# Patient Record
Sex: FEMALE | Race: BLACK OR AFRICAN AMERICAN | Employment: UNEMPLOYED | URBAN - METROPOLITAN AREA
[De-identification: names, ages, dates, MRNs, and addresses within clinical notes are randomized per-mention and may not be internally consistent; named-entity substitution may affect disease eponyms.]

---

## 2020-03-05 PROBLEM — K59.00 CONSTIPATION: Status: ACTIVE | Noted: 2020-03-05

## 2020-03-05 PROBLEM — K92.1 HEMATOCHEZIA: Status: ACTIVE | Noted: 2020-03-05

## 2020-03-05 PROBLEM — R93.3 ABNORMAL DIGESTIVE SYSTEM DIAGNOSTIC IMAGING: Status: ACTIVE | Noted: 2020-03-05

## 2020-11-03 ENCOUNTER — HOSPITAL ENCOUNTER (EMERGENCY)
Age: 40
Discharge: HOME | End: 2020-11-03
Attending: EMERGENCY MEDICINE
Payer: MEDICAID

## 2020-11-03 VITALS
TEMPERATURE: 99 F | DIASTOLIC BLOOD PRESSURE: 100 MMHG | BODY MASS INDEX: 31.83 KG/M2 | HEIGHT: 67 IN | WEIGHT: 202.82 LBS | RESPIRATION RATE: 18 BRPM | SYSTOLIC BLOOD PRESSURE: 185 MMHG | OXYGEN SATURATION: 98 % | HEART RATE: 67 BPM

## 2020-11-03 DIAGNOSIS — F12.188 CANNABIS HYPEREMESIS SYNDROME CONCURRENT WITH AND DUE TO CANNABIS ABUSE (HCC): ICD-10-CM

## 2020-11-03 DIAGNOSIS — F11.93 OPIATE WITHDRAWAL (HCC): Primary | ICD-10-CM

## 2020-11-03 LAB
ALBUMIN SERPL-MCNC: 3.7 G/DL (ref 3.4–5)
ALBUMIN/GLOB SERPL: 0.9 {RATIO} (ref 0.8–1.7)
ALP SERPL-CCNC: 66 U/L (ref 45–117)
ALT SERPL-CCNC: 19 U/L (ref 13–56)
AMORPH CRY URNS QL MICRO: ABNORMAL
ANION GAP SERPL CALC-SCNC: 6 MMOL/L (ref 3–18)
APPEARANCE UR: ABNORMAL
AST SERPL-CCNC: 12 U/L (ref 10–38)
BACTERIA URNS QL MICRO: ABNORMAL /HPF
BASOPHILS # BLD: 0 K/UL (ref 0–0.1)
BASOPHILS NFR BLD: 0 % (ref 0–2)
BILIRUB SERPL-MCNC: 0.5 MG/DL (ref 0.2–1)
BILIRUB UR QL: NEGATIVE
BUN SERPL-MCNC: 6 MG/DL (ref 7–18)
BUN/CREAT SERPL: 7 (ref 12–20)
CALCIUM SERPL-MCNC: 9.4 MG/DL (ref 8.5–10.1)
CHLORIDE SERPL-SCNC: 107 MMOL/L (ref 100–111)
CO2 SERPL-SCNC: 27 MMOL/L (ref 21–32)
COLOR UR: YELLOW
CREAT SERPL-MCNC: 0.88 MG/DL (ref 0.6–1.3)
DIFFERENTIAL METHOD BLD: ABNORMAL
EOSINOPHIL # BLD: 0 K/UL (ref 0–0.4)
EOSINOPHIL NFR BLD: 0 % (ref 0–5)
EPITH CASTS URNS QL MICRO: ABNORMAL /LPF (ref 0–5)
ERYTHROCYTE [DISTWIDTH] IN BLOOD BY AUTOMATED COUNT: 15.4 % (ref 11.6–14.5)
GLOBULIN SER CALC-MCNC: 3.9 G/DL (ref 2–4)
GLUCOSE SERPL-MCNC: 119 MG/DL (ref 74–99)
GLUCOSE UR STRIP.AUTO-MCNC: NEGATIVE MG/DL
HCG SERPL QL: NEGATIVE
HCT VFR BLD AUTO: 42.2 % (ref 35–45)
HGB BLD-MCNC: 14.3 G/DL (ref 12–16)
HGB UR QL STRIP: ABNORMAL
KETONES UR QL STRIP.AUTO: 15 MG/DL
LEUKOCYTE ESTERASE UR QL STRIP.AUTO: NEGATIVE
LIPASE SERPL-CCNC: 34 U/L (ref 73–393)
LYMPHOCYTES # BLD: 1.3 K/UL (ref 0.9–3.6)
LYMPHOCYTES NFR BLD: 20 % (ref 21–52)
MCH RBC QN AUTO: 28 PG (ref 24–34)
MCHC RBC AUTO-ENTMCNC: 33.9 G/DL (ref 31–37)
MCV RBC AUTO: 82.7 FL (ref 74–97)
MONOCYTES # BLD: 0.3 K/UL (ref 0.05–1.2)
MONOCYTES NFR BLD: 5 % (ref 3–10)
NEUTS SEG # BLD: 4.9 K/UL (ref 1.8–8)
NEUTS SEG NFR BLD: 75 % (ref 40–73)
NITRITE UR QL STRIP.AUTO: NEGATIVE
PH UR STRIP: >8.5 [PH] (ref 5–8)
PLATELET # BLD AUTO: 268 K/UL (ref 135–420)
PMV BLD AUTO: 9.6 FL (ref 9.2–11.8)
POTASSIUM SERPL-SCNC: 3.5 MMOL/L (ref 3.5–5.5)
PROT SERPL-MCNC: 7.6 G/DL (ref 6.4–8.2)
PROT UR STRIP-MCNC: NEGATIVE MG/DL
RBC # BLD AUTO: 5.1 M/UL (ref 4.2–5.3)
RBC #/AREA URNS HPF: ABNORMAL /HPF (ref 0–5)
SODIUM SERPL-SCNC: 140 MMOL/L (ref 136–145)
SP GR UR REFRACTOMETRY: 1.02 (ref 1–1.03)
UROBILINOGEN UR QL STRIP.AUTO: 0.2 EU/DL (ref 0.2–1)
WBC # BLD AUTO: 6.6 K/UL (ref 4.6–13.2)
WBC URNS QL MICRO: NEGATIVE /HPF (ref 0–4)

## 2020-11-03 PROCEDURE — 99285 EMERGENCY DEPT VISIT HI MDM: CPT

## 2020-11-03 PROCEDURE — 80053 COMPREHEN METABOLIC PANEL: CPT

## 2020-11-03 PROCEDURE — 84703 CHORIONIC GONADOTROPIN ASSAY: CPT

## 2020-11-03 PROCEDURE — 96375 TX/PRO/DX INJ NEW DRUG ADDON: CPT

## 2020-11-03 PROCEDURE — 81001 URINALYSIS AUTO W/SCOPE: CPT

## 2020-11-03 PROCEDURE — 85025 COMPLETE CBC W/AUTO DIFF WBC: CPT

## 2020-11-03 PROCEDURE — 74011250636 HC RX REV CODE- 250/636: Performed by: EMERGENCY MEDICINE

## 2020-11-03 PROCEDURE — 83690 ASSAY OF LIPASE: CPT

## 2020-11-03 PROCEDURE — 96374 THER/PROPH/DIAG INJ IV PUSH: CPT

## 2020-11-03 PROCEDURE — 96361 HYDRATE IV INFUSION ADD-ON: CPT

## 2020-11-03 RX ORDER — ONDANSETRON 2 MG/ML
4 INJECTION INTRAMUSCULAR; INTRAVENOUS
Status: COMPLETED | OUTPATIENT
Start: 2020-11-03 | End: 2020-11-03

## 2020-11-03 RX ORDER — BUPRENORPHINE HYDROCHLORIDE AND NALOXONE HYDROCHLORIDE DIHYDRATE 8; 2 MG/1; MG/1
1 TABLET SUBLINGUAL
Status: COMPLETED | OUTPATIENT
Start: 2020-11-03 | End: 2020-11-03

## 2020-11-03 RX ORDER — LANOLIN ALCOHOL/MO/W.PET/CERES
400 CREAM (GRAM) TOPICAL
Status: DISCONTINUED | OUTPATIENT
Start: 2020-11-03 | End: 2020-11-03 | Stop reason: HOSPADM

## 2020-11-03 RX ORDER — OMEPRAZOLE 20 MG/1
20 TABLET, DELAYED RELEASE ORAL DAILY
Qty: 15 TAB | Refills: 0 | Status: SHIPPED | OUTPATIENT
Start: 2020-11-03 | End: 2022-04-28 | Stop reason: SDUPTHER

## 2020-11-03 RX ORDER — POTASSIUM CHLORIDE 20 MEQ/1
40 TABLET, EXTENDED RELEASE ORAL
Status: DISCONTINUED | OUTPATIENT
Start: 2020-11-03 | End: 2020-11-03 | Stop reason: HOSPADM

## 2020-11-03 RX ORDER — SODIUM CHLORIDE 9 MG/ML
125 INJECTION, SOLUTION INTRAVENOUS CONTINUOUS
Status: DISCONTINUED | OUTPATIENT
Start: 2020-11-03 | End: 2020-11-03 | Stop reason: HOSPADM

## 2020-11-03 RX ORDER — PROCHLORPERAZINE EDISYLATE 5 MG/ML
10 INJECTION INTRAMUSCULAR; INTRAVENOUS
Status: COMPLETED | OUTPATIENT
Start: 2020-11-03 | End: 2020-11-03

## 2020-11-03 RX ORDER — FAMOTIDINE 10 MG/ML
20 INJECTION INTRAVENOUS
Status: COMPLETED | OUTPATIENT
Start: 2020-11-03 | End: 2020-11-03

## 2020-11-03 RX ORDER — ONDANSETRON 8 MG/1
8 TABLET, ORALLY DISINTEGRATING ORAL
Qty: 12 TAB | Refills: 0 | Status: SHIPPED | OUTPATIENT
Start: 2020-11-03 | End: 2020-11-07

## 2020-11-03 RX ORDER — DIPHENHYDRAMINE HYDROCHLORIDE 50 MG/ML
12.5 INJECTION, SOLUTION INTRAMUSCULAR; INTRAVENOUS ONCE
Status: COMPLETED | OUTPATIENT
Start: 2020-11-03 | End: 2020-11-03

## 2020-11-03 RX ADMIN — ONDANSETRON 4 MG: 2 INJECTION INTRAMUSCULAR; INTRAVENOUS at 07:44

## 2020-11-03 RX ADMIN — FAMOTIDINE 20 MG: 10 INJECTION INTRAVENOUS at 07:43

## 2020-11-03 RX ADMIN — PROCHLORPERAZINE EDISYLATE 10 MG: 5 INJECTION INTRAMUSCULAR; INTRAVENOUS at 08:26

## 2020-11-03 RX ADMIN — DIPHENHYDRAMINE HYDROCHLORIDE 12.5 MG: 50 INJECTION, SOLUTION INTRAMUSCULAR; INTRAVENOUS at 08:26

## 2020-11-03 RX ADMIN — BUPRENORPHINE HYDROCHLORIDE AND NALOXONE HYDROCHLORIDE DIHYDRATE 1 TABLET: 8; 2 TABLET SUBLINGUAL at 07:44

## 2020-11-03 RX ADMIN — BUPRENORPHINE HYDROCHLORIDE AND NALOXONE HYDROCHLORIDE DIHYDRATE 1 TABLET: 8; 2 TABLET SUBLINGUAL at 09:51

## 2020-11-03 RX ADMIN — SODIUM CHLORIDE 1000 ML: 900 INJECTION, SOLUTION INTRAVENOUS at 07:52

## 2020-11-03 RX ADMIN — SODIUM CHLORIDE 125 ML/HR: 900 INJECTION, SOLUTION INTRAVENOUS at 09:29

## 2020-11-03 NOTE — ED TRIAGE NOTES
Pt presents to ED via EMS from home with complaints of heroin withdrawals, nausea, vomiting, and burning abd pain.        Past Medical History:   Diagnosis Date    Constipation     HTN (hypertension)

## 2020-11-03 NOTE — ED PROVIDER NOTES
EMERGENCY DEPARTMENT HISTORY AND PHYSICAL EXAM    7:18 AM      Date: 11/3/2020  Patient Name: Claudetta Flies    History of Presenting Illness     Chief Complaint   Patient presents with    Vomiting     withdrawals    Abdominal Pain         History Provided By: Patient  Location/Duration/Severity/Modifying factors   Patient is a 51-year-old female with a history of hypertension, tubal ligation, pelvic surgeries in the past, the presents emergency department with complaint of nausea and vomiting the last 24 hours. Patient notes that she use 8 caps of heroin today intranasally and stopped using 2 days ago. Patient has progressively had back pain, diarrhea, vomiting, chills, and pain. Patient denies use in the last 2 days and denies any use of medication assisted therapies in the past.  Patient does not say why she wants to stop using heroin. Patient denies any chance being pregnant. Patient denies any sick contacts. Patient denies any other aggravating or alleviating factors. Patient admits to being a smoker, not a regular alcohol user, and heroin/marijuana user. PCP: None    Current Facility-Administered Medications   Medication Dose Route Frequency Provider Last Rate Last Dose    0.9% sodium chloride infusion  125 mL/hr IntraVENous CONTINUOUS Steph Lao  mL/hr at 11/03/20 0929 125 mL/hr at 11/03/20 0929    buprenorphine-naloxone (SUBOXONE) 8-2mg SL tablet  1 Tab SubLINGual NOW Steph Lao MD         Current Outpatient Medications   Medication Sig Dispense Refill    omeprazole (PRILOSEC) 20 mg capsule TAKE 1 CAPSULE BY MOUTH TWICE DAILY FOR 14 DAYS      ondansetron hcl (ZOFRAN) 4 mg tablet TAKE 1 TABLET BY MOUTH EVERY 6 HOURS AS NEEDED FOR NAUSEA      cloNIDine HCl (CATAPRES) 0.2 mg tablet Take  by mouth two (2) times a day.  amLODIPine-benazepril (LOTREL) 10-40 mg per capsule Take 1 Cap by mouth daily.          Past History     Past Medical History:  Past Medical History:   Diagnosis Date    Constipation     HTN (hypertension)        Past Surgical History:  Past Surgical History:   Procedure Laterality Date    COLONOSCOPY N/A 3/5/2020    DIAGNOSTIC COLONOSCOPY performed by [facility]res [facility] at [facility]    HX  SECTION      HX HYSTEROSCOPY WITH ENDOMETRIAL ABLATION      HX PARTIAL THYROIDECTOMY         Family History:  Family History   Problem Relation Age of Onset   Nahid Ortez Hypertension Mother     Hypertension Father        Social History:  Social History     Tobacco Use    Smoking status: Current Every Day Smoker     Packs/day: 1.50     Years: 24.00     Pack years: 36.00    Smokeless tobacco: Former User   Substance Use Topics    Alcohol use: Not Currently    Drug use: Yes     Frequency: 5.0 times per week     Types: Marijuana     Comment: LAST SMOKED MARIJUANA 3/4/2020       Allergies: Allergies   Allergen Reactions    Norco [Hydrocodone-Acetaminophen] Shortness of Breath and Rash         Review of Systems       Review of Systems   Constitutional: Negative for activity change, fatigue and fever. HENT: Negative for congestion and rhinorrhea. Eyes: Negative for visual disturbance. Respiratory: Negative for shortness of breath. Cardiovascular: Negative for chest pain and palpitations. Gastrointestinal: Positive for abdominal pain, diarrhea, nausea and vomiting. Genitourinary: Negative for dysuria and hematuria. Musculoskeletal: Positive for back pain. Skin: Negative for rash. Neurological: Negative for dizziness, weakness and light-headedness. All other systems reviewed and are negative. Physical Exam     Visit Vitals  BP (!) 152/91   Pulse 66   Temp 98.9 °F (37.2 °C)   Resp (!) 34   Ht 5' 7\" (1.702 m)   Wt 92 kg (202 lb 13.2 oz)   SpO2 96%   BMI 31.77 kg/m²         Physical Exam  Vitals signs and nursing note reviewed. Constitutional:       General: She is not in acute distress.      Appearance: She is well-developed. HENT:      Head: Normocephalic and atraumatic. Right Ear: External ear normal.      Left Ear: External ear normal.      Nose: Nose normal.   Eyes:      General: No scleral icterus. Conjunctiva/sclera: Conjunctivae normal.      Pupils: Pupils are equal, round, and reactive to light. Neck:      Musculoskeletal: Normal range of motion and neck supple. Thyroid: No thyromegaly. Vascular: No JVD. Trachea: No tracheal deviation. Cardiovascular:      Rate and Rhythm: Normal rate and regular rhythm. Heart sounds: Normal heart sounds. No murmur. No friction rub. No gallop. Pulmonary:      Effort: Pulmonary effort is normal.      Breath sounds: Normal breath sounds. Chest:      Chest wall: No tenderness. Abdominal:      General: There is no distension. Palpations: Abdomen is soft. Tenderness: There is abdominal tenderness. There is no guarding or rebound. Comments: Increased bowel sounds    Musculoskeletal: Normal range of motion. General: No tenderness. Lymphadenopathy:      Cervical: No cervical adenopathy. Skin:     General: Skin is warm and dry. Neurological:      Mental Status: She is alert and oriented to person, place, and time. Cranial Nerves: No cranial nerve deficit. Coordination: Coordination normal.      Comments: No sensory loss, Gait normal, Motor 5/5   Psychiatric:         Behavior: Behavior normal.         Thought Content:  Thought content normal.         Judgment: Judgment normal.           Diagnostic Study Results     Labs -  Recent Results (from the past 12 hour(s))   CBC WITH AUTOMATED DIFF    Collection Time: 11/03/20  7:40 AM   Result Value Ref Range    WBC 6.6 4.6 - 13.2 K/uL    RBC 5.10 4.20 - 5.30 M/uL    HGB 14.3 12.0 - 16.0 g/dL    HCT 42.2 35.0 - 45.0 %    MCV 82.7 74.0 - 97.0 FL    MCH 28.0 24.0 - 34.0 PG    MCHC 33.9 31.0 - 37.0 g/dL    RDW 15.4 (H) 11.6 - 14.5 %    PLATELET 090 736 - 503 K/uL    MPV 9.6 9.2 - 11.8 FL    NEUTROPHILS 75 (H) 40 - 73 %    LYMPHOCYTES 20 (L) 21 - 52 %    MONOCYTES 5 3 - 10 %    EOSINOPHILS 0 0 - 5 %    BASOPHILS 0 0 - 2 %    ABS. NEUTROPHILS 4.9 1.8 - 8.0 K/UL    ABS. LYMPHOCYTES 1.3 0.9 - 3.6 K/UL    ABS. MONOCYTES 0.3 0.05 - 1.2 K/UL    ABS. EOSINOPHILS 0.0 0.0 - 0.4 K/UL    ABS. BASOPHILS 0.0 0.0 - 0.1 K/UL    DF AUTOMATED     METABOLIC PANEL, COMPREHENSIVE    Collection Time: 11/03/20  7:40 AM   Result Value Ref Range    Sodium 140 136 - 145 mmol/L    Potassium 3.5 3.5 - 5.5 mmol/L    Chloride 107 100 - 111 mmol/L    CO2 27 21 - 32 mmol/L    Anion gap 6 3.0 - 18 mmol/L    Glucose 119 (H) 74 - 99 mg/dL    BUN 6 (L) 7.0 - 18 MG/DL    Creatinine 0.88 0.6 - 1.3 MG/DL    BUN/Creatinine ratio 7 (L) 12 - 20      GFR est AA >60 >60 ml/min/1.73m2    GFR est non-AA >60 >60 ml/min/1.73m2    Calcium 9.4 8.5 - 10.1 MG/DL    Bilirubin, total 0.5 0.2 - 1.0 MG/DL    ALT (SGPT) 19 13 - 56 U/L    AST (SGOT) 12 10 - 38 U/L    Alk.  phosphatase 66 45 - 117 U/L    Protein, total 7.6 6.4 - 8.2 g/dL    Albumin 3.7 3.4 - 5.0 g/dL    Globulin 3.9 2.0 - 4.0 g/dL    A-G Ratio 0.9 0.8 - 1.7     LIPASE    Collection Time: 11/03/20  7:40 AM   Result Value Ref Range    Lipase 34 (L) 73 - 393 U/L   HCG QL SERUM    Collection Time: 11/03/20  7:40 AM   Result Value Ref Range    HCG, Ql. Negative NEG     URINALYSIS W/ RFLX MICROSCOPIC    Collection Time: 11/03/20  8:28 AM   Result Value Ref Range    Color YELLOW      Appearance TURBID      Specific gravity 1.016 1.005 - 1.030      pH (UA) >8.5 (H) 5.0 - 8.0    Protein Negative NEG mg/dL    Glucose Negative NEG mg/dL    Ketone 15 (A) NEG mg/dL    Bilirubin Negative NEG      Blood SMALL (A) NEG      Urobilinogen 0.2 0.2 - 1.0 EU/dL    Nitrites Negative NEG      Leukocyte Esterase Negative NEG     URINE MICROSCOPIC ONLY    Collection Time: 11/03/20  8:28 AM   Result Value Ref Range    WBC Negative 0 - 4 /hpf    RBC 0 to 4 0 - 5 /hpf    Epithelial cells 1+ 0 - 5 /lpf Bacteria 1+ (A) NEG /hpf    Amorphous Crystals 2+ (A) NEG       Radiologic Studies -   No orders to display         Medical Decision Making   I am the first provider for this patient. I reviewed the vital signs, available nursing notes, past medical history, past surgical history, family history and social history. Vital Signs-Reviewed the patient's vital signs. Records Reviewed: Nursing Notes and Old Medical Records (Time of Review: 7:18 AM)    ED Course: Progress Notes, Reevaluation, and Consults:     Patient is improving however still has piloerection and increased bowel sounds. The patient is having intermittent nausea as well. Patient was given a dose of Compazine but will give another dose of Suboxone, her labs are otherwise reassuring and suspect this is hyperemesis cannabinoid syndrome versus opioid withdrawal.Luis De Leon, DO 9:33 AM    Patient is now tolerating p.o. and would not take the oral electrolytes however will have her increase her p.o. fluid intake as well as drink Gatorade at home while she is recovering. We will also give her a list of outpatient Suboxone programs as I think she will benefit from transitioning to an outpatient Suboxone. I discussed the case with the peer  and will also help get the patient into the [address] [place] [address] medication assisted therapy program.Luis De Leon, DO 4:02 PM        Provider Notes (Medical Decision Making):   MDM  Number of Diagnoses or Management Options  Diagnosis management comments: Patient is a 69-year-old female with a history of thyroid disease, hypertension, constipation, opioid dependence who presents emergency department with 1 day of increasing abdominal pain, back pain, chills, nausea, vomiting, and diarrhea after stopping using heroin.   Patient likely has opioid withdrawal however underlying infection versus abdominal pathology will be considered and follow abdominal labs, urinalysis, pregnancy status although the patient's had a tubal ligation, give a dose of Suboxone as the patient has a cows score of 18, and then reevaluate. Latonya Patel, DO 7:31 AM        Procedures          Diagnosis     Clinical Impression:   1. Opiate withdrawal ([facility]    2. Cannabis hyperemesis syndrome concurrent with and due to cannabis abuse ([facility]        Disposition: DC    Follow-up Information    None          Patient's Medications   Start Taking    No medications on file   Continue Taking    AMLODIPINE-BENAZEPRIL (LOTREL) 10-40 MG PER CAPSULE    Take 1 Cap by mouth daily. CLONIDINE HCL (CATAPRES) 0.2 MG TABLET    Take  by mouth two (2) times a day. OMEPRAZOLE (PRILOSEC) 20 MG CAPSULE    TAKE 1 CAPSULE BY MOUTH TWICE DAILY FOR 14 DAYS    ONDANSETRON HCL (ZOFRAN) 4 MG TABLET    TAKE 1 TABLET BY MOUTH EVERY 6 HOURS AS NEEDED FOR NAUSEA   These Medications have changed    No medications on file   Stop Taking    No medications on file     Disclaimer: Sections of this note are dictated using utilizing voice recognition software. Minor typographical errors may be present. If questions arise, please do not hesitate to contact me or call our department.

## 2020-11-03 NOTE — DISCHARGE INSTRUCTIONS
Patient Education       Suboxone Programs in [address] Fax: [phone], phone: [phone]    [place] Fax: [phone], phone: [phone]    [place]:   [facility] - [address]- [facility]- [phone]  [facility]- [address]- [phone]     [address]-  Fax: [phone], phone [phone] or [phone]    [facility]- [phone]        Learning About Heroin Use and Withdrawal  What is heroin use? Heroin is an illegal, highly addictive drug. It's an opioid, like some types of medicines that doctors prescribe to treat pain. Examples of prescribed opioids include hydrocodone, oxycodone, fentanyl, and morphine. But unlike a prescribed opioid, heroin does not have rules for legal use. Heroin has no . The strength of each dose is not known. It is often mixed (cut) with other drugs or things like powdered milk. It may also be cut with poisons, such as strychnine. Often, heroin use starts with the misuse of a prescribed opioid. A person may then switch to heroin because of its lower cost, in spite of the greater danger of using it. A person who uses heroin often will start needing bigger and bigger doses of the drug to get the same effect. This is called tolerance. If a person uses heroin regularly, the body gets used to it. This is called physical dependence. This leads to withdrawal symptoms within a few hours if the person stops using it or uses less. A person who uses heroin daily can become addicted to it within a few weeks. Heroin addiction means a person has a strong need to keep using heroin even though it causes harm to themselves or to others. Heroin addiction is also called opioid use disorder. Heroin overdose and rescue treatment  Taking too much heroin can be dangerous.  An overdose may cause trouble breathing, low blood pressure, a low heart rate, or a coma. It's hard to know how much heroin can cause an overdose. A lot depends on how strong the drug is and what it's cut with. And if a person starts using less heroin, he or she may lose tolerance to it. The person then may be more sensitive to it and may overdose when using less heroin. If you are worried that you or someone you know will take too much heroin, talk to your doctor about a naloxone rescue kit. Naloxone helps reverse the effects of heroin. If you take too much heroin, a kit can save your life. What happens during withdrawal?  If a person who is physically dependent on heroin stops taking it or uses less, he or she will have withdrawal symptoms within a few hours. Symptoms can include anxiety, nausea, sweating, and chills. The person may also have diarrhea, stomach cramps, and muscle aches. These symptoms may be mild or severe. They may feel like the flu (influenza). Withdrawal can last from weeks to months. A person who has stopped using heroin will feel very ill for several days. A doctor may prescribe medicine to help relieve the symptoms. Cravings for heroin usually go away over the next few months. But they may suddenly come back months later. How can a person get help? If a person decides to stop using heroin, it's safest to go through withdrawal under a doctor's care. Treatment for opioid use disorder may include medicine, group therapy, counseling, and education. The person may need to stay in a hospital or treatment center. Sometimes medicines are used to help the person take less heroin over time and then quit. Medicines can help control cravings and ease withdrawal symptoms. Without medicine, people who have opioid use disorder usually go back to using heroin. Treatment focuses on more than heroin use. It helps the person understand why he or she started using heroin in the first place.  It also helps the person cope with the emotions that may come with trying to stop using heroin. Counseling can also help the person's friends and family. It can provide support and teach those people how to give the help that the person needs. Follow-up care is a key part of your treatment and safety. Be sure to make and go to all appointments, and call your doctor if you are having problems. It's also a good idea to know your test results and keep a list of the medicines you take. Where can you learn more? Go to http://janine-trace.info/  Enter P277 in the search box to learn more about \"Learning About Heroin Use and Withdrawal.\"  Current as of: June 29, 2020               Content Version: 12.6  © 2006-2020 [employer]. Care instructions adapted under license by [employer] (which disclaims liability or warranty for this information). If you have questions about a medical condition or this instruction, always ask your healthcare professional. [place] [address] any warranty or liability for your use of this information. Patient Education        Marijuana Use: Care Instructions  Overview  During your exam, traces of marijuana were found in your body. The two most active chemicals in marijuana are THC and CBD. THC affects how you think, act, and feel. It can make you feel very happy or \"high. \" CBD can help you feel relaxed without the \"high. \" Marijuana products usually contain both THC and CBD. THC usually can be found in urine for a few days after marijuana is used. If you regularly use a lot of marijuana, THC may be found for weeks after use has stopped. There are many types, or strains, of marijuana. Each strain has specific THC-to-CBD ratios. Because of this, some strains have different kinds of effects than others. For example, if a strain of marijuana has a higher ratio of THC to CBD, it's more likely to affect your judgment, coordination, and decision making.   In the [place], it's against federal law to possess, sell, give away, or grow marijuana for any purpose. But many states allow people with certain health problems to buy or grow it for their own use. And some states allow people to use it for recreational reasons. These laws vary from state to state. You can call your state department of health or health services to learn more about the laws in your state. If you live in a state where marijuana is legal, know your employer's policies about use. A positive drug test might cause you to lose your job. Or it might keep you from getting hired. If you use marijuana, take steps to lower your risk. Follow-up care is a key part of your treatment and safety. Be sure to make and go to all appointments, and call your doctor if you are having problems. It's also a good idea to know your test results and keep a list of the medicines you take. How can you care for yourself at home? · To have the lowest risk, don't use marijuana. But if you do use it, limit your use. · Know what you're using. Choose products that have low levels of THC. The type (or strain), strength, and effects of marijuana can vary greatly. And understand how soon you may feel the effects of the product you use and how long those effects may last. The product label may have this information. · Don't drive or operate machinery after using marijuana. Using marijuana may affect your judgment, coordination, and decision making. · Don't smoke marijuana. The smoke can damage your lungs. If you do smoke it, don't breathe in deeply and don't hold your breath. · Don't use marijuana with alcohol, tobacco, or illegal drugs. · Reduce the risk of medicine interactions. Marijuana can be dangerous if you take it with blood thinners or with medicines that make you sleepy, control your mood, or lower your blood pressure. Talk to your doctor about other medicines you use before you try marijuana. · Keep others safe. Store marijuana in a safe and secure place. This is even more important with edible marijuana, which can be easily mistaken for treats or snacks. Make sure that children, friends, family, and pets can't get to it. And protect others from secondhand smoke. When should you call for help? Call your doctor now or seek immediate medical care if:    · You have new or worse symptoms of cannabis hyperemesis syndrome (CHS), such as:  ? Vomiting that doesn't stop. ? Not being able to keep down fluids. ? Belly pain. ? Symptoms that go away briefly when you take a hot bath or shower. This is one of the signs of CHS.     · You have symptoms of dehydration, such as:  ? Dry eyes and a dry mouth. ? Passing only a little dark urine. ? Feeling thirstier than usual.   Watch closely for changes in your health, and contact your doctor if:    · You think you have a problem with marijuana use. Where can you learn more? Go to http://www.gray.com/  Enter P683 in the search box to learn more about \"Marijuana Use: Care Instructions. \"  Current as of: June 29, 2020               Content Version: 12.6  © 7730-7491 [employer]. Care instructions adapted under license by [employer] (which disclaims liability or warranty for this information). If you have questions about a medical condition or this instruction, always ask your healthcare professional. [place] [address] any warranty or liability for your use of this information.

## 2020-11-03 NOTE — ED NOTES
Pt vomitted when attempting to take PO magnesium and potassium, ERMD made aware and gave verbal order to hold PO meds and slowly introduce ice chips.

## 2022-03-19 PROBLEM — K59.00 CONSTIPATION: Status: ACTIVE | Noted: 2020-03-05

## 2022-03-19 PROBLEM — R93.3 ABNORMAL DIGESTIVE SYSTEM DIAGNOSTIC IMAGING: Status: ACTIVE | Noted: 2020-03-05

## 2022-03-19 PROBLEM — K92.1 HEMATOCHEZIA: Status: ACTIVE | Noted: 2020-03-05

## 2022-04-28 ENCOUNTER — VIRTUAL VISIT (OUTPATIENT)
Dept: INTERNAL MEDICINE CLINIC | Age: 42
End: 2022-04-28
Payer: MEDICAID

## 2022-04-28 DIAGNOSIS — Z76.89 ESTABLISHING CARE WITH NEW DOCTOR, ENCOUNTER FOR: ICD-10-CM

## 2022-04-28 DIAGNOSIS — Z71.6 ENCOUNTER FOR COUNSELING FOR TOBACCO USE DISORDER: ICD-10-CM

## 2022-04-28 DIAGNOSIS — R11.2 NAUSEA AND VOMITING, UNSPECIFIED VOMITING TYPE: ICD-10-CM

## 2022-04-28 DIAGNOSIS — R10.84 GENERALIZED ABDOMINAL PAIN: Primary | ICD-10-CM

## 2022-04-28 DIAGNOSIS — F17.200 TOBACCO USE DISORDER: ICD-10-CM

## 2022-04-28 PROCEDURE — 99406 BEHAV CHNG SMOKING 3-10 MIN: CPT | Performed by: STUDENT IN AN ORGANIZED HEALTH CARE EDUCATION/TRAINING PROGRAM

## 2022-04-28 PROCEDURE — 99203 OFFICE O/P NEW LOW 30 MIN: CPT | Performed by: STUDENT IN AN ORGANIZED HEALTH CARE EDUCATION/TRAINING PROGRAM

## 2022-04-28 RX ORDER — HYDROCHLOROTHIAZIDE 25 MG/1
25 TABLET ORAL DAILY
COMMUNITY
Start: 2021-07-16

## 2022-04-28 RX ORDER — CLONIDINE HYDROCHLORIDE 0.2 MG/1
0.2 TABLET ORAL 2 TIMES DAILY
COMMUNITY
Start: 2022-04-18

## 2022-04-28 RX ORDER — OMEPRAZOLE 20 MG/1
20 TABLET, DELAYED RELEASE ORAL DAILY
Qty: 30 TABLET | Refills: 0 | Status: SHIPPED | OUTPATIENT
Start: 2022-04-28 | End: 2022-04-29

## 2022-04-28 NOTE — PROGRESS NOTES
For virtual visit patient would like to receive link via TEXT/EMAIL: text    Nano Rosales is a 39 y.o. female (: 1980) evaluated via telephone on 2022 to address:    Chief Complaint   Patient presents with    New Patient    Establish Care    Nausea     x 4-5 days    Vomiting       There were no vitals filed for this visit. Allergies Reviewed. Learning Assessment:   No flowsheet data found. Depression Screening:     3 most recent PHQ Screens 2022   Little interest or pleasure in doing things Not at all   Feeling down, depressed, irritable, or hopeless Not at all   Total Score PHQ 2 0     Fall Risk Assessment:   No flowsheet data found. Abuse Screening:   No flowsheet data found. Coordination of Care Questionaire:   1. Have you been to the ER, urgent care clinic since your last visit? Hospitalized since your last visit? NO    2. Have you seen or consulted any other health care providers outside of the [address] [place] [address] since your last visit? Include any pap smears or colon screening. NO    Advanced Directive:   1. Do you have an Advanced Directive? NO    2. Would you like information on Advanced Directives?  NO

## 2022-04-28 NOTE — PROGRESS NOTES
Eryn Farmer (: 1980) is a 39 y.o. female, new patient, here for evaluation of the following chief complaint(s):   New Patient, Establish Care, Nausea (x 4-5 days), and Vomiting       ASSESSMENT/PLAN:  Below is the assessment and plan developed based on review of pertinent history, labs, studies, and medications. 1. Generalized abdominal pain  -     omeprazole (PriLOSEC OTC) 20 mg tablet; Take 1 Tablet by mouth daily. , Normal, Disp-30 Tablet, R-0  2. Nausea and vomiting, unspecified vomiting type  3. Tobacco use disorder  4. Encounter for counseling for tobacco use disorder  5. Establishing care with new doctor, encounter for  DDx includes viral gastroenteritis vs PUD. Will trial omeprazole 20mg for 1 mo  Advised to increase to solid food as able to tolerate. Remain hydrated  If no improvement after 3 weeks will w/u with ultrasound and GI referral  Counseled on tobacco cessation and harm reduction. Discussed at length for at least 5 min       Return if symptoms worsen or fail to improve. SUBJECTIVE/OBJECTIVE:  New pt here to [facility] care    Vomiting- Starting vomiting for 1 week. Had grandkids on Thursday and the Friday she woke up with vomiting and diarrhea. Has vomited a total of 8 times, thought there was some blood. Only able to keep down liquids. Had a recorded temp of 101. Hasnt tried to take Tylenol. Took prilosec which helpedGrandkids her one of her twins were sick with colds. Review of Systems   Constitutional: Positive for fever. Gastrointestinal: Positive for diarrhea, nausea and vomiting. Negative for blood in stool. Genitourinary: Negative for hematuria.         No data recorded     Physical Exam    [INSTRUCTIONS:  \"[x]\" Indicates a positive item  \"[]\" Indicates a negative item  -- DELETE ALL ITEMS NOT EXAMINED]    Constitutional: [x] Appears well-developed and well-nourished [x] No apparent distress      [] Abnormal -     Mental status: [x] Alert and awake  [x] Oriented to person/place/time [x] Able to follow commands    [] Abnormal -     Eyes:   EOM    [x]  Normal    [] Abnormal -   Sclera  [x]  Normal    [] Abnormal -          Discharge [x]  None visible   [] Abnormal -     HENT: [x] Normocephalic, atraumatic  [] Abnormal -   [x] Mouth/Throat: Mucous membranes are moist    External Ears [x] Normal  [] Abnormal -    Neck: [x] No visualized mass [] Abnormal -     Pulmonary/Chest: [x] Respiratory effort normal   [x] No visualized signs of difficulty breathing or respiratory distress        [] Abnormal -      Musculoskeletal:   [x] Normal gait with no signs of ataxia         [x] Normal range of motion of neck        [] Abnormal -     Neurological:        [x] No Facial Asymmetry (Cranial nerve 7 motor function) (limited exam due to video visit)          [x] No gaze palsy        [] Abnormal -          Skin:        [x] No significant exanthematous lesions or discoloration noted on facial skin         [] Abnormal -            Psychiatric:       [x] Normal Affect [] Abnormal -        [x] No Hallucinations    Other pertinent observable physical exam findings:-        On this date 04/28/2022 I have spent 30 minutes reviewing previous notes, test results and face to face (virtual) with the patient discussing the diagnosis and importance of compliance with the treatment plan as well as documenting on the day of the visitJey Martell, was evaluated through a synchronous (real-time) audio-video encounter. The patient (or guardian if applicable) is aware that this is a billable service, which includes applicable co-pays. Verbal consent to proceed has been obtained. The visit was conducted pursuant to the emergency declaration under the [address] [place] [address] and the [employer] Act. Patient identification was verified, and a caregiver was present when appropriate.  The patient was located at home in a state where the provider was licensed to provide care. An electronic signature was used to authenticate this note.   -- Jose Armando Cannon, DO

## 2022-04-29 ENCOUNTER — TELEPHONE (OUTPATIENT)
Dept: INTERNAL MEDICINE CLINIC | Age: 42
End: 2022-04-29

## 2022-04-29 DIAGNOSIS — R10.84 GENERALIZED ABDOMINAL PAIN: Primary | ICD-10-CM

## 2022-04-29 RX ORDER — OMEPRAZOLE 20 MG/1
20 CAPSULE, DELAYED RELEASE ORAL DAILY
Qty: 30 CAPSULE | Refills: 0 | Status: SHIPPED | OUTPATIENT
Start: 2022-04-29

## 2022-04-29 NOTE — TELEPHONE ENCOUNTER
Message received from the pharmacy. Requesting the Omeprazole Magnesium 20mg tabs be changed to Capsules. The patient's Ins will not cover the tabs. Please send new Rx for the Caps.

## 2022-06-15 ENCOUNTER — OFFICE VISIT (OUTPATIENT)
Dept: INTERNAL MEDICINE CLINIC | Age: 42
End: 2022-06-15
Payer: MEDICAID

## 2022-06-15 VITALS
WEIGHT: 286.2 LBS | DIASTOLIC BLOOD PRESSURE: 82 MMHG | OXYGEN SATURATION: 97 % | TEMPERATURE: 98.1 F | HEIGHT: 67 IN | HEART RATE: 70 BPM | SYSTOLIC BLOOD PRESSURE: 137 MMHG | BODY MASS INDEX: 44.92 KG/M2 | RESPIRATION RATE: 16 BRPM

## 2022-06-15 DIAGNOSIS — Z71.6 ENCOUNTER FOR COUNSELING FOR TOBACCO USE DISORDER: ICD-10-CM

## 2022-06-15 DIAGNOSIS — R25.2 LEG CRAMPING: ICD-10-CM

## 2022-06-15 DIAGNOSIS — I10 PRIMARY HYPERTENSION: Primary | ICD-10-CM

## 2022-06-15 DIAGNOSIS — M79.89 LEG SWELLING: ICD-10-CM

## 2022-06-15 DIAGNOSIS — E66.01 CLASS 3 SEVERE OBESITY WITH BODY MASS INDEX (BMI) OF 40.0 TO 44.9 IN ADULT, UNSPECIFIED OBESITY TYPE, UNSPECIFIED WHETHER SERIOUS COMORBIDITY PRESENT (HCC): ICD-10-CM

## 2022-06-15 DIAGNOSIS — E55.9 VITAMIN D DEFICIENCY: ICD-10-CM

## 2022-06-15 DIAGNOSIS — F17.200 TOBACCO USE DISORDER: ICD-10-CM

## 2022-06-15 PROCEDURE — 99214 OFFICE O/P EST MOD 30 MIN: CPT | Performed by: STUDENT IN AN ORGANIZED HEALTH CARE EDUCATION/TRAINING PROGRAM

## 2022-06-15 PROCEDURE — 99406 BEHAV CHNG SMOKING 3-10 MIN: CPT | Performed by: STUDENT IN AN ORGANIZED HEALTH CARE EDUCATION/TRAINING PROGRAM

## 2022-06-15 RX ORDER — BENAZEPRIL HYDROCHLORIDE 40 MG/1
40 TABLET ORAL DAILY
Qty: 90 TABLET | Refills: 0 | Status: SHIPPED | OUTPATIENT
Start: 2022-06-15

## 2022-06-15 NOTE — PROGRESS NOTES
Elisa Luna presents today for   Chief Complaint   Patient presents with    Follow-up     patient was in BP study and is afraid infection is not gone       Elisa Luna preferred language for health care discussion is english/other. Is someone accompanying this pt? no    Is the patient using any DME equipment during [address]? no    Depression Screening:  3 most recent PHQ Screens 6/15/2022   Little interest or pleasure in doing things Not at all   Feeling down, depressed, irritable, or hopeless Not at all   Total Score PHQ 2 0       Learning Assessment:  No flowsheet data found. Abuse Screening:  No flowsheet data found. Generalized Anxiety  No flowsheet data found. Health Maintenance Due   Topic Date Due    Hepatitis C Screening  Never done    COVID-19 Vaccine (1) Never done    Pneumococcal 0-64 years (1 - PCV) Never done    Cervical cancer screen  01/08/2022   . Health Maintenance reviewed and discussed and ordered per Provider. VACCINES DUE   SCREENINGS DUE       Elisa Luna is updated on all HM    1. \"Have you been to the ER, urgent care clinic since your last visit? Hospitalized since your last visit? \" No    2. \"Have you seen or consulted any other health care providers outside of the [address] [place] [address] since your last visit? \" No     3. For patients aged 39-70: Has the patient had a colonoscopy / FIT/ Cologuard? NA - based on age     If the patient is female:    4. For patients aged 41-77: Has the patient had a mammogram within the past 2 years? No    5. For patients aged 21-65: Has the patient had a pap smear?  No

## 2022-06-15 NOTE — PROGRESS NOTES
HISTORY OF PRESENT ILLNESS  Joe Miller is a 39 y.o. female. Leg swelling- Was n a HTN study starting in March and just finsihed 2 weeks ago. Started with checking BP and then they started with a injection of 3mL every week . First time got the shot got a rash on her abdomen that subsided after the 5th shot. I week ago she noticed that her left leg was swollen and looked like she had gotten a bug bite. F/u with the study dr and was placed on Augmentin for 14 days. After she completed the antibiotic she noticed that the swelling started. Medications were just changed and is currently taking HCTZ, clonidine and benazepril/amlodipine. Checks BP at home. Review of Systems   Respiratory: Negative for shortness of breath. Cardiovascular: Positive for leg swelling. Negative for chest pain and palpitations. Gastrointestinal: Negative for blood in stool. Genitourinary: Negative for hematuria. Neurological: Negative for dizziness and headaches. /82 (BP 1 Location: Left arm, BP Patient Position: Sitting, BP Cuff Size: Large adult)   Pulse 70   Temp 98.1 °F (36.7 °C) (Temporal)   Resp 16   Ht 5' 7\" (1.702 m)   Wt 286 lb 3.2 oz (129.8 kg)   SpO2 97%   BMI 44.83 kg/m²     Physical Exam  Vitals reviewed. Constitutional:       Appearance: Normal appearance. HENT:      Right Ear: Tympanic membrane normal.      Left Ear: Tympanic membrane normal.      Mouth/Throat:      Comments: MASK  Eyes:      Conjunctiva/sclera: Conjunctivae normal.      Pupils: Pupils are equal, round, and reactive to light. Cardiovascular:      Rate and Rhythm: Normal rate and regular rhythm. Pulses: Normal pulses. Heart sounds: Normal heart sounds. Pulmonary:      Effort: Pulmonary effort is normal.      Breath sounds: Normal breath sounds. Abdominal:      General: Abdomen is flat. Musculoskeletal:      Right lower le+ Edema present. Left lower le+ Edema present.    Psychiatric: Mood and Affect: Mood normal.         ASSESSMENT and PLAN    ICD-10-CM ICD-9-CM    1. Primary hypertension  M21 089.3 METABOLIC PANEL, COMPREHENSIVE      benazepriL (LOTENSIN) 40 mg tablet      METABOLIC PANEL, COMPREHENSIVE      [facility] BP FLOWSHEET   2. Class 3 severe obesity with body mass index (BMI) of 40.0 to 44.9 in adult, unspecified obesity type, unspecified whether serious comorbidity present (HCC)  E66.01 278.01 LIPID PANEL    Z68.41 V85.41 LIPID PANEL   3. Leg cramping  R25.2 729.82 MAGNESIUM      VITAMIN D, 25 HYDROXY      VITAMIN D, 25 HYDROXY      MAGNESIUM   4. Leg swelling  E16.56 958.71 METABOLIC PANEL, COMPREHENSIVE      METABOLIC PANEL, COMPREHENSIVE   5. Tobacco use disorder  F17.200 305.1    6. Encounter for counseling for tobacco use disorder  Z71.6 V65.49    Pt to to d/c amlodipine today as could be cause of swelling. Will continue to monitor on clonidine and benazepril. [facility] Will get labs to w/u for organic causes for leg cramps. If no improvement with swelling and cramps will order LEVI. Could be some reaction 2/2 to the investigational study pt is on. Counseled on tobacco cessation, NRT and harm reduction. Discussed at length for at least 5 min   Follow-up and Dispositions    · Return in about 1 month (around 7/15/2022) for BP recheck, HTN.

## 2022-06-16 LAB
25(OH)D3 SERPL-MCNC: 24.3 NG/ML (ref 32–100)
A-G RATIO,AGRAT: 1.9 RATIO (ref 1.1–2.6)
ALBUMIN SERPL-MCNC: 4.2 G/DL (ref 3.5–5)
ALP SERPL-CCNC: 83 U/L (ref 25–115)
ALT SERPL-CCNC: 10 U/L (ref 5–40)
ANION GAP SERPL CALC-SCNC: 12 MMOL/L (ref 3–15)
AST SERPL W P-5'-P-CCNC: 12 U/L (ref 10–37)
BILIRUB SERPL-MCNC: 0.1 MG/DL (ref 0.2–1.2)
BUN SERPL-MCNC: 9 MG/DL (ref 6–22)
CALCIUM SERPL-MCNC: 9.5 MG/DL (ref 8.4–10.5)
CHLORIDE SERPL-SCNC: 98 MMOL/L (ref 98–110)
CHOLEST SERPL-MCNC: 172 MG/DL (ref 110–200)
CO2 SERPL-SCNC: 32 MMOL/L (ref 20–32)
CREAT SERPL-MCNC: 0.8 MG/DL (ref 0.5–1.2)
GLOBULIN,GLOB: 2.2 G/DL (ref 2–4)
GLOMERULAR FILTRATION RATE: >60 ML/MIN/1.73 SQ.M.
GLUCOSE SERPL-MCNC: 87 MG/DL (ref 70–99)
HDLC SERPL-MCNC: 3.4 MG/DL (ref 0–5)
HDLC SERPL-MCNC: 50 MG/DL
LDL/HDL RATIO,LDHD: 2
LDLC SERPL CALC-MCNC: 102 MG/DL (ref 50–99)
MAGNESIUM SERPL-MCNC: 2.1 MG/DL (ref 1.6–2.5)
NON-HDL CHOLESTEROL, 011976: 122 MG/DL
POTASSIUM SERPL-SCNC: 4.1 MMOL/L (ref 3.5–5.5)
PROT SERPL-MCNC: 6.4 G/DL (ref 6.4–8.3)
SODIUM SERPL-SCNC: 142 MMOL/L (ref 133–145)
TRIGL SERPL-MCNC: 99 MG/DL (ref 40–149)
VLDLC SERPL CALC-MCNC: 20 MG/DL (ref 8–30)

## 2022-06-16 RX ORDER — ERGOCALCIFEROL 1.25 MG/1
50000 CAPSULE ORAL
Qty: 8 CAPSULE | Refills: 1 | Status: SHIPPED | OUTPATIENT
Start: 2022-06-16

## 2022-06-16 NOTE — PROGRESS NOTES
Your vitamin D level is low. This could be contributing to your low energy levels. I will send a prescription over for a supplement. You will take [id number] one time a week for 8 weeks. At that time we will recheck to make sure your levels are coming up.

## 2022-09-16 ENCOUNTER — HOSPITAL ENCOUNTER (OUTPATIENT)
Dept: LAB | Age: 42
Discharge: HOME | End: 2022-09-16
Payer: MEDICAID

## 2022-09-16 ENCOUNTER — TRANSCRIBE ORDER (OUTPATIENT)
Dept: REGISTRATION | Age: 42
End: 2022-09-16

## 2022-09-16 DIAGNOSIS — F11.20 OPIOID TYPE DEPENDENCE, CONTINUOUS (HCC): Primary | ICD-10-CM

## 2022-09-16 DIAGNOSIS — F11.20 OPIOID TYPE DEPENDENCE, CONTINUOUS (HCC): ICD-10-CM

## 2022-09-16 PROCEDURE — 93005 ELECTROCARDIOGRAM TRACING: CPT

## 2022-09-30 LAB
ATRIAL RATE: 76 BPM
CALCULATED P AXIS, ECG09: 43 DEGREES
CALCULATED R AXIS, ECG10: -10 DEGREES
DIAGNOSIS, 93000: NORMAL
P-R INTERVAL, ECG05: 186 MS
Q-T INTERVAL, ECG07: 422 MS
QRS DURATION, ECG06: 84 MS
QTC CALCULATION (BEZET), ECG08: 474 MS
VENTRICULAR RATE, ECG03: 76 BPM

## 2022-11-09 ENCOUNTER — OFFICE VISIT (OUTPATIENT)
Dept: INTERNAL MEDICINE CLINIC | Age: 42
End: 2022-11-09
Payer: MEDICAID

## 2022-11-09 VITALS
HEART RATE: 78 BPM | OXYGEN SATURATION: 96 % | HEIGHT: 67 IN | DIASTOLIC BLOOD PRESSURE: 125 MMHG | RESPIRATION RATE: 20 BRPM | BODY MASS INDEX: 45.99 KG/M2 | SYSTOLIC BLOOD PRESSURE: 181 MMHG | TEMPERATURE: 97.2 F | WEIGHT: 293 LBS

## 2022-11-09 DIAGNOSIS — E55.9 VITAMIN D DEFICIENCY: ICD-10-CM

## 2022-11-09 DIAGNOSIS — R10.33 PERIUMBILICAL ABDOMINAL PAIN: ICD-10-CM

## 2022-11-09 DIAGNOSIS — F17.200 TOBACCO USE DISORDER: ICD-10-CM

## 2022-11-09 DIAGNOSIS — B37.2 SKIN YEAST INFECTION: ICD-10-CM

## 2022-11-09 DIAGNOSIS — Z71.6 ENCOUNTER FOR COUNSELING FOR TOBACCO USE DISORDER: ICD-10-CM

## 2022-11-09 DIAGNOSIS — I10 PRIMARY HYPERTENSION: Primary | ICD-10-CM

## 2022-11-09 PROCEDURE — 3075F SYST BP GE 130 - 139MM HG: CPT | Performed by: STUDENT IN AN ORGANIZED HEALTH CARE EDUCATION/TRAINING PROGRAM

## 2022-11-09 PROCEDURE — 99214 OFFICE O/P EST MOD 30 MIN: CPT | Performed by: STUDENT IN AN ORGANIZED HEALTH CARE EDUCATION/TRAINING PROGRAM

## 2022-11-09 PROCEDURE — 3078F DIAST BP <80 MM HG: CPT | Performed by: STUDENT IN AN ORGANIZED HEALTH CARE EDUCATION/TRAINING PROGRAM

## 2022-11-09 PROCEDURE — 99406 BEHAV CHNG SMOKING 3-10 MIN: CPT | Performed by: STUDENT IN AN ORGANIZED HEALTH CARE EDUCATION/TRAINING PROGRAM

## 2022-11-09 RX ORDER — CLONIDINE HYDROCHLORIDE 0.2 MG/1
0.2 TABLET ORAL 2 TIMES DAILY
Qty: 180 TABLET | Refills: 0 | Status: SHIPPED | OUTPATIENT
Start: 2022-11-09 | End: 2022-11-09 | Stop reason: SDUPTHER

## 2022-11-09 RX ORDER — BENAZEPRIL HYDROCHLORIDE 40 MG/1
40 TABLET ORAL DAILY
Qty: 90 TABLET | Refills: 0 | Status: SHIPPED | OUTPATIENT
Start: 2022-11-09 | End: 2022-11-09 | Stop reason: SDUPTHER

## 2022-11-09 RX ORDER — NYSTATIN 100000 [USP'U]/G
POWDER TOPICAL 4 TIMES DAILY
Qty: 60 G | Refills: 0 | Status: SHIPPED | OUTPATIENT
Start: 2022-11-09

## 2022-11-09 RX ORDER — NYSTATIN 100000 U/G
CREAM TOPICAL 2 TIMES DAILY
Qty: 15 G | Refills: 0 | Status: SHIPPED | OUTPATIENT
Start: 2022-11-09

## 2022-11-09 RX ORDER — BENAZEPRIL HYDROCHLORIDE 40 MG/1
40 TABLET ORAL DAILY
Qty: 90 TABLET | Refills: 0 | Status: SHIPPED | OUTPATIENT
Start: 2022-11-09

## 2022-11-09 RX ORDER — ERGOCALCIFEROL 1.25 MG/1
50000 CAPSULE ORAL
Qty: 8 CAPSULE | Refills: 1 | Status: SHIPPED | OUTPATIENT
Start: 2022-11-09 | End: 2022-11-09 | Stop reason: SDUPTHER

## 2022-11-09 RX ORDER — CLONIDINE HYDROCHLORIDE 0.2 MG/1
0.2 TABLET ORAL 2 TIMES DAILY
Qty: 180 TABLET | Refills: 0 | Status: SHIPPED | OUTPATIENT
Start: 2022-11-09

## 2022-11-09 RX ORDER — ERGOCALCIFEROL 1.25 MG/1
50000 CAPSULE ORAL
Qty: 8 CAPSULE | Refills: 1 | Status: SHIPPED | OUTPATIENT
Start: 2022-11-09

## 2022-11-09 NOTE — PROGRESS NOTES
Ru Fontaine is a 39 y.o. female (: 1980) presenting to address:    Chief Complaint   Patient presents with    Skin Problem    Mass     Abd        Vitals:    22 1344   BP: (!) 181/125   Pulse: 78   Resp: 20   Temp: 97.2 °F (36.2 °C)   TempSrc: Temporal   SpO2: 96%   Weight: 293 lb (132.9 kg)   Height: 5' 7\" (1.702 m)   PainSc:   6   PainLoc: Abdomen       Hearing/Vision:   No results found. Learning Assessment:   No flowsheet data found. Depression Screening:     3 most recent PHQ Screens 2022   Little interest or pleasure in doing things Not at all   Feeling down, depressed, irritable, or hopeless Not at all   Total Score PHQ 2 0     Fall Risk Assessment:   No flowsheet data found. Abuse Screening:   No flowsheet data found. Coordination of Care Questionaire:     Advanced Directive:   1. Do you have an Advanced Directive? NO    2. Would you like information on Advanced Directives? NO    1. \"Have you been to the ER, urgent care clinic since your last visit? Hospitalized since your last visit? \" No    2. \"Have you seen or consulted any other health care providers outside of the [address] [place] [address] since your last visit? \" No     3. For patients aged 39-70: Has the patient had a colonoscopy? NA - based on age     If the patient is female:    4. For patients aged 41-77: Has the patient had a mammogram within the past 2 years? No    5. For patients aged 21-65: Has the patient had a pap smear?  No

## 2022-11-09 NOTE — PROGRESS NOTES
HISTORY OF PRESENT ILLNESS  Dinora Cramer is a 39 y.o. female. Presents today with rash    HTN- Taking clonidine and benazipril 40mg. Denies changes in vision hearing or headaches. Admits to not taking  medication today. BP Readings from Last 3 Encounters:  11/09/22 : (!) 181/125  06/15/22 : 137/82  11/07/20 : (!) 147/106      Presents today with concerns for ingrown hair and rash-Developed a pimple from ingrown hair and then tried to wash with alcohol and antiseptic/analgesic spray. She noticed that the bump and opened up and began to drain. Then 3 days later she noticed a red rash that is itching. When she tried to clean the rash with antiseptic spray it burned. Review of Systems   Constitutional:  Negative for fever. Gastrointestinal:  Negative for abdominal pain. Skin:  Positive for rash. Physical Exam  Abdominal:      Tenderness: There is no abdominal tenderness. There is no rebound. Hernia: No hernia is present. Skin:     Findings: Erythema and rash present. Rash is macular and scaling. ASSESSMENT and PLAN    ICD-10-CM ICD-9-CM    1. Primary hypertension  I10 401.9 REFERRAL TO CARDIOLOGY      cloNIDine HCL (CATAPRES) 0.2 mg tablet      benazepriL (LOTENSIN) 40 mg tablet      DISCONTINUED: cloNIDine HCL (CATAPRES) 0.2 mg tablet      DISCONTINUED: benazepriL (LOTENSIN) 40 mg tablet      2. Periumbilical abdominal pain  R10.33 789.05 US ABD COMP      3. Vitamin D deficiency  E55.9 268.9 ergocalciferol (ERGOCALCIFEROL) 1,250 mcg (50,000 unit) capsule      DISCONTINUED: ergocalciferol (ERGOCALCIFEROL) 1,250 mcg (50,000 unit) capsule      4. Skin yeast infection  B37.2 112.3 nystatin (MYCOSTATIN) powder      nystatin (MYCOSTATIN) topical cream      5. Tobacco use disorder  F17.200 305.1       6. Encounter for counseling for tobacco use disorder  Z71.6 V65.49       BP not at goal. Will need to add additional antihypertensive. At next visit if still elevated.  Referral to cardiology for further management and ECHO  ? hernia. Ordered abd ultrasound. Consider abd CT and referral to surgeon based on findings  Rash indicative of candida yeast infection. Rx nystatin powder and cream   Counseled on tobacco cessation, NRT and harm reduction. Encouraged to participate in nicotine quit now program. Discussed at length for at least 5 min   Follow-up and Dispositions    Return in about 1 month (around 12/9/2022) for BP recheck, HTN.

## 2022-12-03 ENCOUNTER — HOSPITAL ENCOUNTER (EMERGENCY)
Age: 42
Discharge: STILL ADMITTED OR ADMINISTRATIVE | End: 2022-12-03

## 2022-12-03 ENCOUNTER — HOSPITAL ENCOUNTER (OUTPATIENT)
Dept: ULTRASOUND IMAGING | Age: 42
End: 2022-12-03
Attending: STUDENT IN AN ORGANIZED HEALTH CARE EDUCATION/TRAINING PROGRAM
Payer: MEDICAID

## 2022-12-03 DIAGNOSIS — R10.33 PERIUMBILICAL ABDOMINAL PAIN: ICD-10-CM

## 2022-12-03 PROCEDURE — 76705 ECHO EXAM OF ABDOMEN: CPT

## 2023-07-14 RX ORDER — HYDROCHLOROTHIAZIDE 25 MG/1
25 TABLET ORAL DAILY
Qty: 30 TABLET | Refills: 0 | Status: CANCELLED | OUTPATIENT
Start: 2023-07-14

## 2023-07-15 DIAGNOSIS — I10 ESSENTIAL (PRIMARY) HYPERTENSION: Primary | ICD-10-CM

## 2023-07-15 RX ORDER — HYDROCHLOROTHIAZIDE 25 MG/1
25 TABLET ORAL DAILY
Qty: 30 TABLET | Refills: 0 | Status: SHIPPED | OUTPATIENT
Start: 2023-07-15

## 2023-07-15 RX ORDER — CLONIDINE HYDROCHLORIDE 0.2 MG/1
0.2 TABLET ORAL 2 TIMES DAILY
Qty: 60 TABLET | Refills: 0 | Status: SHIPPED | OUTPATIENT
Start: 2023-07-15

## 2023-07-15 RX ORDER — BENAZEPRIL HYDROCHLORIDE 40 MG/1
40 TABLET, FILM COATED ORAL DAILY
Qty: 30 TABLET | Refills: 0 | Status: SHIPPED | OUTPATIENT
Start: 2023-07-15

## 2023-07-15 RX ORDER — CLONIDINE HYDROCHLORIDE 0.2 MG/1
TABLET ORAL
Qty: 180 TABLET | OUTPATIENT
Start: 2023-07-15

## 2023-07-15 RX ORDER — BENAZEPRIL HYDROCHLORIDE 40 MG/1
TABLET, FILM COATED ORAL
Qty: 90 TABLET | OUTPATIENT
Start: 2023-07-15

## 2023-08-13 DIAGNOSIS — I10 ESSENTIAL (PRIMARY) HYPERTENSION: ICD-10-CM

## 2023-08-13 RX ORDER — CLONIDINE HYDROCHLORIDE 0.2 MG/1
TABLET ORAL
Qty: 60 TABLET | Refills: 2 | Status: SHIPPED | OUTPATIENT
Start: 2023-08-13

## 2023-08-28 ENCOUNTER — OFFICE VISIT (OUTPATIENT)
Facility: CLINIC | Age: 43
End: 2023-08-28
Payer: MEDICAID

## 2023-08-28 VITALS
SYSTOLIC BLOOD PRESSURE: 160 MMHG | OXYGEN SATURATION: 94 % | TEMPERATURE: 97.2 F | WEIGHT: 293 LBS | HEART RATE: 84 BPM | DIASTOLIC BLOOD PRESSURE: 98 MMHG | BODY MASS INDEX: 45.99 KG/M2 | HEIGHT: 67 IN | RESPIRATION RATE: 18 BRPM

## 2023-08-28 DIAGNOSIS — E66.01 CLASS 3 SEVERE OBESITY WITH BODY MASS INDEX (BMI) OF 50.0 TO 59.9 IN ADULT, UNSPECIFIED OBESITY TYPE, UNSPECIFIED WHETHER SERIOUS COMORBIDITY PRESENT (HCC): ICD-10-CM

## 2023-08-28 DIAGNOSIS — E04.1 LEFT THYROID NODULE: ICD-10-CM

## 2023-08-28 DIAGNOSIS — Z13.89 OBESITY SCREEN: ICD-10-CM

## 2023-08-28 DIAGNOSIS — Z71.82 EXERCISE COUNSELING: ICD-10-CM

## 2023-08-28 DIAGNOSIS — Z71.3 DIETARY COUNSELING: ICD-10-CM

## 2023-08-28 DIAGNOSIS — I10 ESSENTIAL (PRIMARY) HYPERTENSION: Primary | ICD-10-CM

## 2023-08-28 DIAGNOSIS — Z76.89 ENCOUNTER FOR WEIGHT MANAGEMENT: ICD-10-CM

## 2023-08-28 DIAGNOSIS — R60.0 LOWER LEG EDEMA: ICD-10-CM

## 2023-08-28 PROCEDURE — 3080F DIAST BP >= 90 MM HG: CPT | Performed by: STUDENT IN AN ORGANIZED HEALTH CARE EDUCATION/TRAINING PROGRAM

## 2023-08-28 PROCEDURE — 99214 OFFICE O/P EST MOD 30 MIN: CPT | Performed by: STUDENT IN AN ORGANIZED HEALTH CARE EDUCATION/TRAINING PROGRAM

## 2023-08-28 PROCEDURE — 3077F SYST BP >= 140 MM HG: CPT | Performed by: STUDENT IN AN ORGANIZED HEALTH CARE EDUCATION/TRAINING PROGRAM

## 2023-08-28 PROCEDURE — 99401 PREV MED CNSL INDIV APPRX 15: CPT | Performed by: STUDENT IN AN ORGANIZED HEALTH CARE EDUCATION/TRAINING PROGRAM

## 2023-08-28 RX ORDER — AMLODIPINE BESYLATE 5 MG/1
5 TABLET ORAL DAILY
Qty: 30 TABLET | Refills: 2 | Status: SHIPPED | OUTPATIENT
Start: 2023-08-28

## 2023-08-28 ASSESSMENT — PATIENT HEALTH QUESTIONNAIRE - PHQ9
SUM OF ALL RESPONSES TO PHQ9 QUESTIONS 1 & 2: 0
1. LITTLE INTEREST OR PLEASURE IN DOING THINGS: 0
SUM OF ALL RESPONSES TO PHQ QUESTIONS 1-9: 0
2. FEELING DOWN, DEPRESSED OR HOPELESS: 0
SUM OF ALL RESPONSES TO PHQ QUESTIONS 1-9: 0

## 2023-08-28 ASSESSMENT — ENCOUNTER SYMPTOMS
ABDOMINAL PAIN: 0
SHORTNESS OF BREATH: 0

## 2023-08-28 NOTE — PROGRESS NOTES
1. \"Have you been to the ER, urgent care clinic since your last visit? Hospitalized since your last visit? \" No    2. \"Have you seen or consulted any other health care providers outside of the [address] [place] [address] since your last visit? \" Yes    3. For patients aged 43-73: Has the patient had a colonoscopy / FIT/ Cologuard? yes      If the patient is female:    4. For patients aged 43-66: Has the patient had a mammogram within the past 2 years? No      5. For patients aged 21-65: Has the patient had a pap smear? Yes - Care Gap present.  Rooming MA/LPN to request most recent results

## 2023-09-06 RX ORDER — HYDROCHLOROTHIAZIDE 25 MG/1
TABLET ORAL
Qty: 90 TABLET | Refills: 0 | Status: SHIPPED | OUTPATIENT
Start: 2023-09-06

## 2023-09-12 DIAGNOSIS — I10 ESSENTIAL (PRIMARY) HYPERTENSION: ICD-10-CM

## 2023-09-12 RX ORDER — BENAZEPRIL HYDROCHLORIDE 40 MG/1
40 TABLET, FILM COATED ORAL DAILY
Qty: 90 TABLET | Refills: 0 | Status: SHIPPED | OUTPATIENT
Start: 2023-09-12 | End: 2023-10-17

## 2023-10-17 ENCOUNTER — OFFICE VISIT (OUTPATIENT)
Facility: CLINIC | Age: 43
End: 2023-10-17
Payer: MEDICAID

## 2023-10-17 VITALS
HEART RATE: 101 BPM | OXYGEN SATURATION: 96 % | HEIGHT: 67 IN | RESPIRATION RATE: 18 BRPM | BODY MASS INDEX: 45.99 KG/M2 | WEIGHT: 293 LBS | SYSTOLIC BLOOD PRESSURE: 126 MMHG | DIASTOLIC BLOOD PRESSURE: 85 MMHG | TEMPERATURE: 96.9 F

## 2023-10-17 DIAGNOSIS — Z76.89 ENCOUNTER FOR WEIGHT MANAGEMENT: ICD-10-CM

## 2023-10-17 DIAGNOSIS — H65.93 FLUID LEVEL BEHIND TYMPANIC MEMBRANE OF BOTH EARS: ICD-10-CM

## 2023-10-17 DIAGNOSIS — I10 ESSENTIAL (PRIMARY) HYPERTENSION: Primary | ICD-10-CM

## 2023-10-17 DIAGNOSIS — Z13.89 OBESITY SCREEN: ICD-10-CM

## 2023-10-17 DIAGNOSIS — E66.01 CLASS 3 SEVERE OBESITY WITH BODY MASS INDEX (BMI) OF 40.0 TO 44.9 IN ADULT, UNSPECIFIED OBESITY TYPE, UNSPECIFIED WHETHER SERIOUS COMORBIDITY PRESENT (HCC): ICD-10-CM

## 2023-10-17 PROCEDURE — 99401 PREV MED CNSL INDIV APPRX 15: CPT | Performed by: STUDENT IN AN ORGANIZED HEALTH CARE EDUCATION/TRAINING PROGRAM

## 2023-10-17 PROCEDURE — 3079F DIAST BP 80-89 MM HG: CPT | Performed by: STUDENT IN AN ORGANIZED HEALTH CARE EDUCATION/TRAINING PROGRAM

## 2023-10-17 PROCEDURE — 99214 OFFICE O/P EST MOD 30 MIN: CPT | Performed by: STUDENT IN AN ORGANIZED HEALTH CARE EDUCATION/TRAINING PROGRAM

## 2023-10-17 PROCEDURE — 3074F SYST BP LT 130 MM HG: CPT | Performed by: STUDENT IN AN ORGANIZED HEALTH CARE EDUCATION/TRAINING PROGRAM

## 2023-10-17 RX ORDER — AMLODIPINE AND BENAZEPRIL HYDROCHLORIDE 5; 40 MG/1; MG/1
1 CAPSULE ORAL DAILY
Qty: 90 CAPSULE | Refills: 0 | Status: SHIPPED | OUTPATIENT
Start: 2023-10-17

## 2023-10-17 RX ORDER — LORATADINE 10 MG/1
10 TABLET ORAL DAILY
Qty: 30 TABLET | Refills: 0 | Status: SHIPPED | OUTPATIENT
Start: 2023-10-17 | End: 2023-11-16

## 2023-10-17 RX ORDER — ERGOCALCIFEROL 1.25 MG/1
50000 CAPSULE ORAL
Qty: 4 CAPSULE | Refills: 3 | Status: SHIPPED | OUTPATIENT
Start: 2023-10-17

## 2023-10-17 RX ORDER — AMLODIPINE AND BENAZEPRIL HYDROCHLORIDE 5; 40 MG/1; MG/1
1 CAPSULE ORAL DAILY
Qty: 30 CAPSULE | Refills: 3 | Status: SHIPPED | OUTPATIENT
Start: 2023-10-17 | End: 2023-10-17

## 2023-10-17 SDOH — ECONOMIC STABILITY: INCOME INSECURITY: HOW HARD IS IT FOR YOU TO PAY FOR THE VERY BASICS LIKE FOOD, HOUSING, MEDICAL CARE, AND HEATING?: NOT HARD AT ALL

## 2023-10-17 SDOH — ECONOMIC STABILITY: TRANSPORTATION INSECURITY
IN THE PAST 12 MONTHS, HAS LACK OF TRANSPORTATION KEPT YOU FROM MEETINGS, WORK, OR FROM GETTING THINGS NEEDED FOR DAILY LIVING?: YES

## 2023-10-17 SDOH — ECONOMIC STABILITY: FOOD INSECURITY: WITHIN THE PAST 12 MONTHS, THE FOOD YOU BOUGHT JUST DIDN'T LAST AND YOU DIDN'T HAVE MONEY TO GET MORE.: NEVER TRUE

## 2023-10-17 SDOH — ECONOMIC STABILITY: FOOD INSECURITY: WITHIN THE PAST 12 MONTHS, THE FOOD YOU BOUGHT JUST DIDN'T LAST AND YOU DIDN'T HAVE MONEY TO GET MORE.: OFTEN TRUE

## 2023-10-17 SDOH — ECONOMIC STABILITY: HOUSING INSECURITY
IN THE LAST 12 MONTHS, WAS THERE A TIME WHEN YOU DID NOT HAVE A STEADY PLACE TO SLEEP OR SLEPT IN A SHELTER (INCLUDING NOW)?: NO

## 2023-10-17 SDOH — ECONOMIC STABILITY: FOOD INSECURITY: WITHIN THE PAST 12 MONTHS, YOU WORRIED THAT YOUR FOOD WOULD RUN OUT BEFORE YOU GOT MONEY TO BUY MORE.: NEVER TRUE

## 2023-10-17 SDOH — ECONOMIC STABILITY: FOOD INSECURITY: WITHIN THE PAST 12 MONTHS, YOU WORRIED THAT YOUR FOOD WOULD RUN OUT BEFORE YOU GOT MONEY TO BUY MORE.: PATIENT DECLINED

## 2023-10-17 ASSESSMENT — PATIENT HEALTH QUESTIONNAIRE - PHQ9
2. FEELING DOWN, DEPRESSED OR HOPELESS: 0
SUM OF ALL RESPONSES TO PHQ QUESTIONS 1-9: 0
SUM OF ALL RESPONSES TO PHQ QUESTIONS 1-9: 0
SUM OF ALL RESPONSES TO PHQ9 QUESTIONS 1 & 2: 0
SUM OF ALL RESPONSES TO PHQ QUESTIONS 1-9: 0
SUM OF ALL RESPONSES TO PHQ QUESTIONS 1-9: 0
1. LITTLE INTEREST OR PLEASURE IN DOING THINGS: 0

## 2023-10-17 ASSESSMENT — ENCOUNTER SYMPTOMS
ABDOMINAL PAIN: 0
SHORTNESS OF BREATH: 0
COUGH: 1

## 2023-10-17 NOTE — PROGRESS NOTES
1. \"Have you been to the ER, urgent care clinic since your last visit? Hospitalized since your last visit? \" No    2. \"Have you seen or consulted any other health care providers outside of the [address] [place] [address] since your last visit? \" Yes    3. For patients aged 43-73: Has the patient had a colonoscopy / FIT/ Cologuard? Yes - no Care Gap present      If the patient is female:    4. For patients aged 43-66: Has the patient had a mammogram within the past 2 years? No      5. For patients aged 21-65: Has the patient had a pap smear?  Yes - no Care Gap present

## 2023-11-13 DIAGNOSIS — I10 ESSENTIAL (PRIMARY) HYPERTENSION: ICD-10-CM

## 2023-11-13 RX ORDER — CLONIDINE HYDROCHLORIDE 0.2 MG/1
TABLET ORAL
Qty: 60 TABLET | Refills: 2 | Status: SHIPPED | OUTPATIENT
Start: 2023-11-13

## 2023-11-16 ENCOUNTER — PATIENT MESSAGE (OUTPATIENT)
Facility: CLINIC | Age: 43
End: 2023-11-16

## 2023-11-17 DIAGNOSIS — I10 ESSENTIAL (PRIMARY) HYPERTENSION: ICD-10-CM

## 2023-11-20 RX ORDER — AMLODIPINE AND BENAZEPRIL HYDROCHLORIDE 5; 40 MG/1; MG/1
1 CAPSULE ORAL DAILY
Qty: 90 CAPSULE | Refills: 0 | OUTPATIENT
Start: 2023-11-20

## 2023-11-20 RX ORDER — ERGOCALCIFEROL 1.25 MG/1
50000 CAPSULE ORAL
Qty: 4 CAPSULE | Refills: 3 | OUTPATIENT
Start: 2023-11-20

## 2023-11-22 NOTE — TELEPHONE ENCOUNTER
Spoke with pt in regards to medication inquiries. Two patient identifier's verified. Relayed the PCP's note, Dr. Norma Ramsay not able to prescribe these medications. Ivy Beady is on back order and Ozempic is for diabetics. If this is a medication she is seeking she can consider establishing with our medical weight loss clinic and they will see her. For the lamisil she will need an appointment to discuss further. Pt acknowledges understanding and states she is agreeable to scheduling an appointment for her foot issues. Pt voices no further concerns at this time.

## 2023-11-26 DIAGNOSIS — I10 ESSENTIAL (PRIMARY) HYPERTENSION: ICD-10-CM

## 2023-11-26 RX ORDER — BENAZEPRIL HYDROCHLORIDE 40 MG/1
40 TABLET ORAL DAILY
Qty: 90 TABLET | OUTPATIENT
Start: 2023-11-26

## 2023-11-26 RX ORDER — AMLODIPINE AND BENAZEPRIL HYDROCHLORIDE 5; 40 MG/1; MG/1
1 CAPSULE ORAL DAILY
Qty: 90 CAPSULE | Refills: 0 | Status: SHIPPED | OUTPATIENT
Start: 2023-11-26

## 2023-12-07 RX ORDER — HYDROCHLOROTHIAZIDE 25 MG/1
TABLET ORAL
Qty: 90 TABLET | Refills: 0 | Status: SHIPPED | OUTPATIENT
Start: 2023-12-07

## 2023-12-15 ENCOUNTER — OFFICE VISIT (OUTPATIENT)
Facility: CLINIC | Age: 43
End: 2023-12-15
Payer: MEDICAID

## 2023-12-15 VITALS
DIASTOLIC BLOOD PRESSURE: 88 MMHG | SYSTOLIC BLOOD PRESSURE: 148 MMHG | TEMPERATURE: 97.2 F | HEART RATE: 107 BPM | RESPIRATION RATE: 22 BRPM | OXYGEN SATURATION: 94 % | HEIGHT: 67 IN | BODY MASS INDEX: 45.99 KG/M2 | WEIGHT: 293 LBS

## 2023-12-15 DIAGNOSIS — Z79.899 MEDICATION MANAGEMENT: ICD-10-CM

## 2023-12-15 DIAGNOSIS — B35.1 ONYCHOMYCOSIS: Primary | ICD-10-CM

## 2023-12-15 DIAGNOSIS — I10 ESSENTIAL (PRIMARY) HYPERTENSION: ICD-10-CM

## 2023-12-15 PROCEDURE — 99214 OFFICE O/P EST MOD 30 MIN: CPT | Performed by: STUDENT IN AN ORGANIZED HEALTH CARE EDUCATION/TRAINING PROGRAM

## 2023-12-15 PROCEDURE — 3077F SYST BP >= 140 MM HG: CPT | Performed by: STUDENT IN AN ORGANIZED HEALTH CARE EDUCATION/TRAINING PROGRAM

## 2023-12-15 PROCEDURE — 3079F DIAST BP 80-89 MM HG: CPT | Performed by: STUDENT IN AN ORGANIZED HEALTH CARE EDUCATION/TRAINING PROGRAM

## 2023-12-15 RX ORDER — TERBINAFINE HYDROCHLORIDE 250 MG/1
250 TABLET ORAL DAILY
Qty: 42 TABLET | Refills: 0 | Status: SHIPPED | OUTPATIENT
Start: 2023-12-15 | End: 2024-01-26

## 2023-12-15 ASSESSMENT — PATIENT HEALTH QUESTIONNAIRE - PHQ9
2. FEELING DOWN, DEPRESSED OR HOPELESS: 0
SUM OF ALL RESPONSES TO PHQ QUESTIONS 1-9: 0
1. LITTLE INTEREST OR PLEASURE IN DOING THINGS: 0
SUM OF ALL RESPONSES TO PHQ QUESTIONS 1-9: 0
SUM OF ALL RESPONSES TO PHQ QUESTIONS 1-9: 0
SUM OF ALL RESPONSES TO PHQ9 QUESTIONS 1 & 2: 0
SUM OF ALL RESPONSES TO PHQ QUESTIONS 1-9: 0

## 2023-12-15 NOTE — PROGRESS NOTES
1. \"Have you been to the ER, urgent care clinic since your last visit? Hospitalized since your last visit? \" No    2. \"Have you seen or consulted any other health care providers outside of the [address] [place] [address] since your last visit? \" No    3. For patients aged 43-73: Has the patient had a colonoscopy / FIT/ Cologuard? NA - based on age      If the patient is female:    4. For patients aged 43-66: Has the patient had a mammogram within the past 2 years? No      5. For patients aged 21-65: Has the patient had a pap smear?  No

## 2024-01-18 ENCOUNTER — OFFICE VISIT (OUTPATIENT)
Facility: CLINIC | Age: 44
End: 2024-01-18
Payer: MEDICAID

## 2024-01-18 VITALS
BODY MASS INDEX: 45.99 KG/M2 | HEIGHT: 67 IN | DIASTOLIC BLOOD PRESSURE: 80 MMHG | SYSTOLIC BLOOD PRESSURE: 136 MMHG | TEMPERATURE: 97 F | WEIGHT: 293 LBS | HEART RATE: 89 BPM | OXYGEN SATURATION: 99 % | RESPIRATION RATE: 20 BRPM

## 2024-01-18 DIAGNOSIS — Z76.89 ENCOUNTER FOR WEIGHT MANAGEMENT: ICD-10-CM

## 2024-01-18 DIAGNOSIS — I10 ESSENTIAL (PRIMARY) HYPERTENSION: Primary | ICD-10-CM

## 2024-01-18 DIAGNOSIS — Z13.89 OBESITY SCREEN: ICD-10-CM

## 2024-01-18 DIAGNOSIS — E66.01 CLASS 3 SEVERE OBESITY WITH BODY MASS INDEX (BMI) OF 45.0 TO 49.9 IN ADULT, UNSPECIFIED OBESITY TYPE, UNSPECIFIED WHETHER SERIOUS COMORBIDITY PRESENT (HCC): ICD-10-CM

## 2024-01-18 PROCEDURE — 99401 PREV MED CNSL INDIV APPRX 15: CPT | Performed by: STUDENT IN AN ORGANIZED HEALTH CARE EDUCATION/TRAINING PROGRAM

## 2024-01-18 PROCEDURE — 99214 OFFICE O/P EST MOD 30 MIN: CPT | Performed by: STUDENT IN AN ORGANIZED HEALTH CARE EDUCATION/TRAINING PROGRAM

## 2024-01-18 PROCEDURE — 3079F DIAST BP 80-89 MM HG: CPT | Performed by: STUDENT IN AN ORGANIZED HEALTH CARE EDUCATION/TRAINING PROGRAM

## 2024-01-18 PROCEDURE — 3075F SYST BP GE 130 - 139MM HG: CPT | Performed by: STUDENT IN AN ORGANIZED HEALTH CARE EDUCATION/TRAINING PROGRAM

## 2024-01-18 RX ORDER — AMLODIPINE BESYLATE 5 MG/1
5 TABLET ORAL DAILY
Qty: 90 TABLET | Refills: 0 | Status: SHIPPED | OUTPATIENT
Start: 2024-01-18

## 2024-01-18 RX ORDER — BENAZEPRIL HYDROCHLORIDE 40 MG/1
40 TABLET ORAL DAILY
Qty: 90 TABLET | Refills: 0 | Status: SHIPPED | OUTPATIENT
Start: 2024-01-18

## 2024-01-18 ASSESSMENT — PATIENT HEALTH QUESTIONNAIRE - PHQ9
SUM OF ALL RESPONSES TO PHQ QUESTIONS 1-9: 0
SUM OF ALL RESPONSES TO PHQ QUESTIONS 1-9: 0
SUM OF ALL RESPONSES TO PHQ9 QUESTIONS 1 & 2: 0
2. FEELING DOWN, DEPRESSED OR HOPELESS: 0
SUM OF ALL RESPONSES TO PHQ QUESTIONS 1-9: 0
1. LITTLE INTEREST OR PLEASURE IN DOING THINGS: 0
SUM OF ALL RESPONSES TO PHQ QUESTIONS 1-9: 0

## 2024-01-18 ASSESSMENT — ENCOUNTER SYMPTOMS
SHORTNESS OF BREATH: 0
ABDOMINAL PAIN: 0

## 2024-01-18 NOTE — PROGRESS NOTES
1. \"Have you been to the ER, urgent care clinic since your last visit?  Hospitalized since your last visit?\" No    2. \"Have you seen or consulted any other health care providers outside of the [facility] since your last visit?\" No    3. For patients aged 45-75: Has the patient had a colonoscopy / FIT/ Cologuard? Yes - no Care Gap present      If the patient is female:    4. For patients aged 40-74: Has the patient had a mammogram within the past 2 years? No      5. For patients aged 21-65: Has the patient had a pap smear? Yes - no Care Gap present

## 2024-01-18 NOTE — PROGRESS NOTES
HISTORY OF PRESENT ILLNESS  Rashaun Gerardo is a 43 y.o. female presenting today for   Chief Complaint   Patient presents with    Hypertension        HTN- Started lotrel at last visit. She is unable to swallow the capsule as its too big. She has been trying to open it up but hasn't had much success. She is awaiting to get surgical clearance from cardio. Demies changes in vision hearing or headaches.            Review of Systems   Eyes:  Negative for visual disturbance.   Respiratory:  Negative for shortness of breath.    Cardiovascular:  Negative for chest pain.   Gastrointestinal:  Negative for abdominal pain.   Neurological:  Negative for headaches.         /80   Pulse 89   Temp 97 °F (36.1 °C) (Skin)   Resp 20   Ht 1.702 m (5' 7\")   Wt (!) 143.8 kg (317 lb)   SpO2 99%   BMI 49.65 kg/m²     Physical Exam  HENT:      Mouth/Throat:      Comments: MASK  Eyes:      Pupils: Pupils are equal, round, and reactive to light.   Cardiovascular:      Rate and Rhythm: Normal rate and regular rhythm.   Pulmonary:      Effort: Pulmonary effort is normal.      Breath sounds: Normal breath sounds.   Musculoskeletal:      Right lower leg: No edema.      Left lower leg: No edema.   Psychiatric:         Mood and Affect: Mood normal.         ASSESSMENT and PLAN    ICD-10-CM    1. Essential (primary) hypertension  I10 benazepril (LOTENSIN) 40 MG tablet     amLODIPine (NORVASC) 5 MG tablet      2. Class 3 severe obesity with body mass index (BMI) of 45.0 to 49.9 in adult, unspecified obesity type, unspecified whether serious comorbidity present ([facility])  E66.01     Z68.42       3. Encounter for weight management  Z76.89       4. Obesity screen  Z13.89         HTN-Chronic  -Stable. Continue current dose of amlodipine, benzapril .   -Encouraged to check BP at home  Discussed importance of limiting sodium in diet and getting 150min of exercise a week.     Obesity- Chronic  -Encouraged to continue successful weight loss plan.

## 2024-01-25 ENCOUNTER — OFFICE VISIT (OUTPATIENT)
Age: 44
End: 2024-01-25
Payer: MEDICAID

## 2024-01-25 VITALS
DIASTOLIC BLOOD PRESSURE: 80 MMHG | WEIGHT: 293 LBS | SYSTOLIC BLOOD PRESSURE: 119 MMHG | HEART RATE: 84 BPM | BODY MASS INDEX: 48.4 KG/M2 | OXYGEN SATURATION: 96 %

## 2024-01-25 DIAGNOSIS — R94.31 ABNORMAL EKG: Primary | ICD-10-CM

## 2024-01-25 DIAGNOSIS — Z01.818 PRE-OP TESTING: ICD-10-CM

## 2024-01-25 PROCEDURE — 99204 OFFICE O/P NEW MOD 45 MIN: CPT | Performed by: INTERNAL MEDICINE

## 2024-01-25 NOTE — PROGRESS NOTES
under Allergies and Med Rec to remove outside inquires. All correct information has been verified with patient and added to chart.     Medication's patient's would liked removed has been marked not taking to be removed per Verbal order and read back per Panchito Smalls MD

## 2024-01-25 NOTE — PROGRESS NOTES
release capsule Take 1 capsule by mouth daily    terbinafine (LAMISIL) 250 MG tablet Take 1 tablet by mouth daily    ergocalciferol (ERGOCALCIFEROL) 1.25 MG (19726 UT) capsule Take 1 capsule by mouth every 7 days    ondansetron (ZOFRAN-ODT) 8 MG TBDP disintegrating tablet Take 1 tablet by mouth every 8 hours as needed     No current facility-administered medications for this visit.       Past Surgical History:   Procedure Laterality Date     SECTION      COLONOSCOPY N/A 3/5/2020    DIAGNOSTIC COLONOSCOPY performed by Toney Brennan MD at [facility] ENDOSCOPY    ENDOMETRIAL ABLATION      THYROIDECTOMY, PARTIAL         Allergies and Sensitivities:  Allergies   Allergen Reactions    Hydrocodone-Acetaminophen Rash and Shortness Of Breath       Family History:  Family History   Problem Relation Age of Onset    Hypertension Mother     Hypertension Father        Social History:  Social History     Tobacco Use    Smoking status: Former     Current packs/day: 0.00     Average packs/day: 1.5 packs/day for 30.0 years (45.0 ttl pk-yrs)     Types: Cigarettes     Start date: 1993     Quit date: 2023     Years since quittin.4     Passive exposure: Never    Smokeless tobacco: Former   Substance Use Topics    Alcohol use: Not Currently    Drug use: Not Currently     Frequency: 5.0 times per week     Types: Heroin, Marijuana (Weed)     She  reports that she quit smoking about 5 months ago. Her smoking use included cigarettes. She started smoking about 30 years ago. She has a 45.0 pack-year smoking history. She has never been exposed to tobacco smoke. She has quit using smokeless tobacco.  She  reports that she does not currently use alcohol.    Physical Exam:  BP Readings from Last 3 Encounters:   24 119/80   24 136/80   12/15/23 (!) 148/88         Pulse Readings from Last 3 Encounters:   24 84   24 89   12/15/23 (!) 107          Wt Readings from Last 3 Encounters:   24 (!) 140.2

## 2024-02-06 DIAGNOSIS — I10 ESSENTIAL (PRIMARY) HYPERTENSION: ICD-10-CM

## 2024-02-06 RX ORDER — CLONIDINE HYDROCHLORIDE 0.2 MG/1
TABLET ORAL
Qty: 60 TABLET | Refills: 2 | Status: SHIPPED | OUTPATIENT
Start: 2024-02-06

## 2024-02-21 DIAGNOSIS — I10 ESSENTIAL (PRIMARY) HYPERTENSION: ICD-10-CM

## 2024-02-22 RX ORDER — ERGOCALCIFEROL 1.25 MG/1
50000 CAPSULE ORAL
Qty: 4 CAPSULE | Refills: 3 | Status: SHIPPED | OUTPATIENT
Start: 2024-02-22

## 2024-02-23 RX ORDER — AMLODIPINE BESYLATE 5 MG/1
5 TABLET ORAL DAILY
Qty: 90 TABLET | Refills: 0 | Status: SHIPPED | OUTPATIENT
Start: 2024-02-23

## 2024-03-01 DIAGNOSIS — I10 ESSENTIAL (PRIMARY) HYPERTENSION: ICD-10-CM

## 2024-03-04 RX ORDER — BENAZEPRIL HYDROCHLORIDE 40 MG/1
40 TABLET ORAL DAILY
Qty: 90 TABLET | Refills: 0 | Status: SHIPPED | OUTPATIENT
Start: 2024-03-04

## 2024-03-11 RX ORDER — HYDROCHLOROTHIAZIDE 25 MG/1
TABLET ORAL
Qty: 90 TABLET | Refills: 0 | Status: SHIPPED | OUTPATIENT
Start: 2024-03-11

## 2024-03-22 ENCOUNTER — TELEPHONE (OUTPATIENT)
Age: 44
End: 2024-03-22

## 2024-03-22 NOTE — TELEPHONE ENCOUNTER
Incoming from Dr. Seo. Two identifiers confirmed. Pt requesting surgical clearance.     [facility]  [facility]  Provider: Eli  Phone Number: [phone]  Fax Number:  [phone]    Patient needs a clearance for surgery that's Monday. It needs to be reworded to say he's cleared or not

## 2024-03-25 PROBLEM — K76.0 HEPATIC STEATOSIS: Status: ACTIVE | Noted: 2024-03-25

## 2024-03-25 PROBLEM — M19.90 DJD (DEGENERATIVE JOINT DISEASE): Status: ACTIVE | Noted: 2024-03-25

## 2024-03-25 PROBLEM — E66.01 SEVERE OBESITY (HCC): Status: ACTIVE | Noted: 2024-03-25

## 2024-03-25 PROBLEM — I10 ESSENTIAL HYPERTENSION: Status: ACTIVE | Noted: 2023-05-22

## 2024-03-25 PROBLEM — M15.9 GENERALIZED OSTEOARTHRITIS: Status: ACTIVE | Noted: 2023-05-22

## 2024-05-15 ENCOUNTER — TELEPHONE (OUTPATIENT)
Age: 44
End: 2024-05-15

## 2024-06-08 DIAGNOSIS — I10 ESSENTIAL (PRIMARY) HYPERTENSION: ICD-10-CM

## 2024-06-10 RX ORDER — AMLODIPINE AND BENAZEPRIL HYDROCHLORIDE 5; 40 MG/1; MG/1
1 CAPSULE ORAL DAILY
Qty: 90 CAPSULE | Refills: 0 | OUTPATIENT
Start: 2024-06-10

## 2024-06-10 RX ORDER — HYDROCHLOROTHIAZIDE 25 MG/1
TABLET ORAL
Qty: 90 TABLET | Refills: 0 | OUTPATIENT
Start: 2024-06-10

## 2024-06-17 ENCOUNTER — OFFICE VISIT (OUTPATIENT)
Facility: CLINIC | Age: 44
End: 2024-06-17
Payer: MEDICAID

## 2024-06-17 VITALS
OXYGEN SATURATION: 97 % | RESPIRATION RATE: 20 BRPM | TEMPERATURE: 97 F | SYSTOLIC BLOOD PRESSURE: 120 MMHG | HEART RATE: 78 BPM | WEIGHT: 260 LBS | DIASTOLIC BLOOD PRESSURE: 83 MMHG | BODY MASS INDEX: 40.81 KG/M2 | HEIGHT: 67 IN

## 2024-06-17 DIAGNOSIS — Z13.89 OBESITY SCREEN: ICD-10-CM

## 2024-06-17 DIAGNOSIS — R20.0 NUMBNESS IN RIGHT LEG: ICD-10-CM

## 2024-06-17 DIAGNOSIS — I10 ESSENTIAL (PRIMARY) HYPERTENSION: Primary | ICD-10-CM

## 2024-06-17 DIAGNOSIS — E66.01 CLASS 3 SEVERE OBESITY WITH BODY MASS INDEX (BMI) OF 40.0 TO 44.9 IN ADULT, UNSPECIFIED OBESITY TYPE, UNSPECIFIED WHETHER SERIOUS COMORBIDITY PRESENT (HCC): ICD-10-CM

## 2024-06-17 DIAGNOSIS — M25.561 CHRONIC PAIN OF RIGHT KNEE: ICD-10-CM

## 2024-06-17 DIAGNOSIS — G89.29 CHRONIC PAIN OF RIGHT KNEE: ICD-10-CM

## 2024-06-17 PROCEDURE — 3079F DIAST BP 80-89 MM HG: CPT | Performed by: STUDENT IN AN ORGANIZED HEALTH CARE EDUCATION/TRAINING PROGRAM

## 2024-06-17 PROCEDURE — 99214 OFFICE O/P EST MOD 30 MIN: CPT | Performed by: STUDENT IN AN ORGANIZED HEALTH CARE EDUCATION/TRAINING PROGRAM

## 2024-06-17 PROCEDURE — 99401 PREV MED CNSL INDIV APPRX 15: CPT | Performed by: STUDENT IN AN ORGANIZED HEALTH CARE EDUCATION/TRAINING PROGRAM

## 2024-06-17 PROCEDURE — 3074F SYST BP LT 130 MM HG: CPT | Performed by: STUDENT IN AN ORGANIZED HEALTH CARE EDUCATION/TRAINING PROGRAM

## 2024-06-17 ASSESSMENT — ENCOUNTER SYMPTOMS
SHORTNESS OF BREATH: 0
ABDOMINAL PAIN: 0

## 2024-06-17 ASSESSMENT — PATIENT HEALTH QUESTIONNAIRE - PHQ9
SUM OF ALL RESPONSES TO PHQ QUESTIONS 1-9: 0
1. LITTLE INTEREST OR PLEASURE IN DOING THINGS: NOT AT ALL
SUM OF ALL RESPONSES TO PHQ QUESTIONS 1-9: 0
2. FEELING DOWN, DEPRESSED OR HOPELESS: NOT AT ALL
SUM OF ALL RESPONSES TO PHQ9 QUESTIONS 1 & 2: 0

## 2024-06-17 NOTE — PROGRESS NOTES
HISTORY OF PRESENT ILLNESS  Joi Gerardo is a 43 y.o. female presenting today for   Chief Complaint   Patient presents with    Hypertension        HTN- Taking Amlodipine 5mg and Benazepril 40mg daily. Denies changes in vision hearing or headaches.     Knee pain- Loc on the R. This is chronic. She has noticed that since her weight loss surgery she has been exercising more with walking with a brisk walk and as a result she's noticing worsening of knee pain that is described as feeling a  numb patch when the knee swells. She is currently using using a nervine cream that she started 3 days ago.       Medications reviewed and updated.    Review of Systems   Eyes:  Negative for visual disturbance.   Respiratory:  Negative for shortness of breath.    Cardiovascular:  Negative for chest pain.   Gastrointestinal:  Negative for abdominal pain.   Musculoskeletal:  Positive for arthralgias (knee pain).   Neurological:  Negative for headaches.         /83   Pulse 78   Temp 97 °F (36.1 °C) (Skin)   Resp 20   Ht 1.702 m (5' 7\")   Wt 117.9 kg (260 lb)   SpO2 97%   BMI 40.72 kg/m²     Physical Exam  Constitutional:       Appearance: She is obese.   HENT:      Mouth/Throat:      Comments: MASK  Eyes:      Pupils: Pupils are equal, round, and reactive to light.   Cardiovascular:      Rate and Rhythm: Normal rate and regular rhythm.   Pulmonary:      Effort: Pulmonary effort is normal.      Breath sounds: Normal breath sounds.   Musculoskeletal:      Right lower leg: No edema.      Left lower leg: No edema.   Psychiatric:         Mood and Affect: Mood normal.         ASSESSMENT and PLAN    ICD-10-CM    1. Essential (primary) hypertension  I10       2. Chronic pain of right knee  M25.561 Pete Kay MD, Physical Medicine [facility] Rehab, [place] ([facility])    G89.29 XR KNEE RIGHT (1-2 VIEWS)     CANCELED: XR KNEE RIGHT (3 VIEWS)      3. Numbness in right leg  R20.0 Pete Kay,

## 2024-07-31 DIAGNOSIS — I10 ESSENTIAL (PRIMARY) HYPERTENSION: ICD-10-CM

## 2024-07-31 RX ORDER — BENAZEPRIL HYDROCHLORIDE 40 MG/1
40 TABLET ORAL DAILY
Qty: 90 TABLET | Refills: 0 | Status: SHIPPED | OUTPATIENT
Start: 2024-07-31

## 2024-07-31 RX ORDER — AMLODIPINE BESYLATE 5 MG/1
5 TABLET ORAL DAILY
Qty: 90 TABLET | Refills: 0 | Status: SHIPPED | OUTPATIENT
Start: 2024-07-31

## 2024-08-14 ENCOUNTER — HOSPITAL ENCOUNTER (OUTPATIENT)
Dept: WOMENS IMAGING | Facility: HOSPITAL | Age: 44
Discharge: HOME | End: 2024-08-17
Attending: STUDENT IN AN ORGANIZED HEALTH CARE EDUCATION/TRAINING PROGRAM
Payer: MEDICAID

## 2024-08-14 DIAGNOSIS — Z12.31 SCREENING MAMMOGRAM FOR BREAST CANCER: ICD-10-CM

## 2024-08-14 PROCEDURE — 77063 BREAST TOMOSYNTHESIS BI: CPT

## 2024-08-19 ENCOUNTER — HOSPITAL ENCOUNTER (EMERGENCY)
Facility: HOSPITAL | Age: 44
Discharge: HOME | End: 2024-08-19
Payer: MEDICAID

## 2024-08-19 ENCOUNTER — APPOINTMENT (OUTPATIENT)
Facility: HOSPITAL | Age: 44
End: 2024-08-19
Payer: MEDICAID

## 2024-08-19 VITALS
HEIGHT: 67 IN | TEMPERATURE: 98.6 F | SYSTOLIC BLOOD PRESSURE: 124 MMHG | BODY MASS INDEX: 40.81 KG/M2 | HEART RATE: 77 BPM | RESPIRATION RATE: 16 BRPM | WEIGHT: 260 LBS | OXYGEN SATURATION: 99 % | DIASTOLIC BLOOD PRESSURE: 76 MMHG

## 2024-08-19 DIAGNOSIS — N61.0 MASTITIS IN FEMALE: Primary | ICD-10-CM

## 2024-08-19 LAB
ALBUMIN SERPL-MCNC: 3.1 G/DL (ref 3.4–5)
ALBUMIN/GLOB SERPL: 0.9 (ref 0.8–1.7)
ALP SERPL-CCNC: 71 U/L (ref 45–117)
ALT SERPL-CCNC: 13 U/L (ref 13–56)
ANION GAP SERPL CALC-SCNC: 6 MMOL/L (ref 3–18)
AST SERPL-CCNC: 12 U/L (ref 10–38)
BASOPHILS # BLD: 0 K/UL (ref 0–0.1)
BASOPHILS NFR BLD: 0 % (ref 0–2)
BILIRUB SERPL-MCNC: 0.4 MG/DL (ref 0.2–1)
BUN SERPL-MCNC: 7 MG/DL (ref 7–18)
BUN/CREAT SERPL: 9 (ref 12–20)
CALCIUM SERPL-MCNC: 8.6 MG/DL (ref 8.5–10.1)
CHLORIDE SERPL-SCNC: 107 MMOL/L (ref 100–111)
CO2 SERPL-SCNC: 26 MMOL/L (ref 21–32)
CREAT SERPL-MCNC: 0.74 MG/DL (ref 0.6–1.3)
DIFFERENTIAL METHOD BLD: ABNORMAL
EOSINOPHIL # BLD: 0.2 K/UL (ref 0–0.4)
EOSINOPHIL NFR BLD: 3 % (ref 0–5)
ERYTHROCYTE [DISTWIDTH] IN BLOOD BY AUTOMATED COUNT: 17.2 % (ref 11.6–14.5)
GLOBULIN SER CALC-MCNC: 3.6 G/DL (ref 2–4)
GLUCOSE SERPL-MCNC: 77 MG/DL (ref 74–99)
HCT VFR BLD AUTO: 37.8 % (ref 35–45)
HGB BLD-MCNC: 12.1 G/DL (ref 12–16)
IMM GRANULOCYTES # BLD AUTO: 0 K/UL (ref 0–0.04)
IMM GRANULOCYTES NFR BLD AUTO: 0 % (ref 0–0.5)
LYMPHOCYTES # BLD: 2.1 K/UL (ref 0.9–3.6)
LYMPHOCYTES NFR BLD: 26 % (ref 21–52)
MCH RBC QN AUTO: 26.4 PG (ref 24–34)
MCHC RBC AUTO-ENTMCNC: 32 G/DL (ref 31–37)
MCV RBC AUTO: 82.4 FL (ref 78–100)
MONOCYTES # BLD: 0.5 K/UL (ref 0.05–1.2)
MONOCYTES NFR BLD: 6 % (ref 3–10)
NEUTS SEG # BLD: 5 K/UL (ref 1.8–8)
NEUTS SEG NFR BLD: 64 % (ref 40–73)
NRBC # BLD: 0 K/UL (ref 0–0.01)
NRBC BLD-RTO: 0 PER 100 WBC
PLATELET # BLD AUTO: 265 K/UL (ref 135–420)
PMV BLD AUTO: 10.4 FL (ref 9.2–11.8)
POTASSIUM SERPL-SCNC: 3.8 MMOL/L (ref 3.5–5.5)
PROT SERPL-MCNC: 6.7 G/DL (ref 6.4–8.2)
RBC # BLD AUTO: 4.59 M/UL (ref 4.2–5.3)
SODIUM SERPL-SCNC: 139 MMOL/L (ref 136–145)
WBC # BLD AUTO: 7.8 K/UL (ref 4.6–13.2)

## 2024-08-19 PROCEDURE — 80053 COMPREHEN METABOLIC PANEL: CPT

## 2024-08-19 PROCEDURE — 96374 THER/PROPH/DIAG INJ IV PUSH: CPT

## 2024-08-19 PROCEDURE — 99284 EMERGENCY DEPT VISIT MOD MDM: CPT

## 2024-08-19 PROCEDURE — 6360000002 HC RX W HCPCS

## 2024-08-19 PROCEDURE — 85025 COMPLETE CBC W/AUTO DIFF WBC: CPT

## 2024-08-19 PROCEDURE — 76642 ULTRASOUND BREAST LIMITED: CPT

## 2024-08-19 RX ORDER — CEPHALEXIN 250 MG/1
250 CAPSULE ORAL 2 TIMES DAILY
Qty: 14 CAPSULE | Refills: 0 | Status: SHIPPED | OUTPATIENT
Start: 2024-08-19 | End: 2024-08-26

## 2024-08-19 RX ORDER — MORPHINE SULFATE 2 MG/ML
2 INJECTION, SOLUTION INTRAMUSCULAR; INTRAVENOUS
Status: COMPLETED | OUTPATIENT
Start: 2024-08-19 | End: 2024-08-19

## 2024-08-19 RX ADMIN — MORPHINE SULFATE 2 MG: 2 INJECTION, SOLUTION INTRAMUSCULAR; INTRAVENOUS at 17:50

## 2024-08-19 ASSESSMENT — PAIN DESCRIPTION - ORIENTATION: ORIENTATION: RIGHT

## 2024-08-19 ASSESSMENT — PAIN DESCRIPTION - LOCATION: LOCATION: BREAST

## 2024-08-19 ASSESSMENT — PAIN SCALES - GENERAL: PAINLEVEL_OUTOF10: 8

## 2024-08-19 NOTE — ED PROVIDER NOTES
Use: Former     Smokeless Tobacco Use: Former     Passive Exposure: Never   Alcohol Use: Not on file   Financial Resource Strain: Low Risk  (10/17/2023)    Overall Financial Resource Strain (CARDIA)     Difficulty of Paying Living Expenses: Not hard at all   Food Insecurity: No Food Insecurity (3/25/2024)    Hunger Vital Sign     Worried About Running Out of Food in the Last Year: Never true     Ran Out of Food in the Last Year: Never true   Transportation Needs: No Transportation Needs (3/25/2024)    PRAPARE - Transportation     Lack of Transportation (Medical): No     Lack of Transportation (Non-Medical): No   Physical Activity: Not on file   Stress: Not on file   Social Connections: Not on file   Intimate Partner Violence: Not on file   Depression: Not at risk (6/17/2024)    PHQ-2     PHQ-2 Score: 0   Housing Stability: Low Risk  (3/25/2024)    Housing Stability Vital Sign     Unable to Pay for Housing in the Last Year: No     Number of Places Lived in the Last Year: 1     Unstable Housing in the Last Year: No   Interpersonal Safety: Not At Risk (3/25/2024)    Interpersonal Safety Domain Source: IP Abuse Screening     Physical abuse: Denies     Verbal abuse: Denies     Emotional abuse: Denies     Financial abuse: Denies     Sexual abuse: Denies   Utilities: Not At Risk (3/25/2024)    [facility] Utilities     Threatened with loss of utilities: No       Review of Systems   Review of Systems   Negative except as listed above in HPI.    Physical Exam   Physical Exam  Vitals and nursing note reviewed. Exam conducted with a chaperone present.   Constitutional:       Appearance: Normal appearance.   HENT:      Head: Normocephalic and atraumatic.   Cardiovascular:      Rate and Rhythm: Normal rate and regular rhythm.      Pulses: Normal pulses.      Heart sounds: Normal heart sounds.   Pulmonary:      Effort: Pulmonary effort is normal.   Chest:   Breasts:     Right: Inverted nipple, skin change and tenderness present.

## 2024-08-19 NOTE — ED NOTES
Pt provided AVS and d/c instructions. Pt v/u and had no further concerns. Pt is ambulatory and discharged home in no distress.

## 2024-08-19 NOTE — DISCHARGE INSTRUCTIONS
Please apply warm compress to breast, massage often, please take antibiotics as prescribed, and return to the ER immediately if worsening or development

## 2024-08-24 ENCOUNTER — APPOINTMENT (OUTPATIENT)
Facility: HOSPITAL | Age: 44
End: 2024-08-24
Payer: MEDICAID

## 2024-08-24 ENCOUNTER — HOSPITAL ENCOUNTER (EMERGENCY)
Facility: HOSPITAL | Age: 44
Discharge: HOME | End: 2024-08-24
Payer: MEDICAID

## 2024-08-24 VITALS
SYSTOLIC BLOOD PRESSURE: 153 MMHG | HEIGHT: 67 IN | HEART RATE: 78 BPM | TEMPERATURE: 98.5 F | WEIGHT: 260 LBS | BODY MASS INDEX: 40.81 KG/M2 | DIASTOLIC BLOOD PRESSURE: 99 MMHG | OXYGEN SATURATION: 99 % | RESPIRATION RATE: 18 BRPM

## 2024-08-24 DIAGNOSIS — N61.0 MASTITIS: Primary | ICD-10-CM

## 2024-08-24 LAB
ALBUMIN SERPL-MCNC: 3.1 G/DL (ref 3.4–5)
ALBUMIN/GLOB SERPL: 0.8 (ref 0.8–1.7)
ALP SERPL-CCNC: 77 U/L (ref 45–117)
ALT SERPL-CCNC: 12 U/L (ref 13–56)
ANION GAP SERPL CALC-SCNC: 4 MMOL/L (ref 3–18)
AST SERPL-CCNC: 10 U/L (ref 10–38)
BASOPHILS # BLD: 0 K/UL (ref 0–0.1)
BASOPHILS NFR BLD: 0 % (ref 0–2)
BILIRUB SERPL-MCNC: 0.2 MG/DL (ref 0.2–1)
BUN SERPL-MCNC: 7 MG/DL (ref 7–18)
BUN/CREAT SERPL: 10 (ref 12–20)
CALCIUM SERPL-MCNC: 8.6 MG/DL (ref 8.5–10.1)
CHLORIDE SERPL-SCNC: 103 MMOL/L (ref 100–111)
CO2 SERPL-SCNC: 31 MMOL/L (ref 21–32)
CREAT SERPL-MCNC: 0.69 MG/DL (ref 0.6–1.3)
DIFFERENTIAL METHOD BLD: ABNORMAL
EOSINOPHIL # BLD: 0.2 K/UL (ref 0–0.4)
EOSINOPHIL NFR BLD: 2 % (ref 0–5)
ERYTHROCYTE [DISTWIDTH] IN BLOOD BY AUTOMATED COUNT: 16.5 % (ref 11.6–14.5)
GLOBULIN SER CALC-MCNC: 3.8 G/DL (ref 2–4)
GLUCOSE SERPL-MCNC: 90 MG/DL (ref 74–99)
HCT VFR BLD AUTO: 37.6 % (ref 35–45)
HGB BLD-MCNC: 12 G/DL (ref 12–16)
IMM GRANULOCYTES # BLD AUTO: 0 K/UL (ref 0–0.04)
IMM GRANULOCYTES NFR BLD AUTO: 0 % (ref 0–0.5)
LYMPHOCYTES # BLD: 1.9 K/UL (ref 0.9–3.6)
LYMPHOCYTES NFR BLD: 21 % (ref 21–52)
MCH RBC QN AUTO: 26.2 PG (ref 24–34)
MCHC RBC AUTO-ENTMCNC: 31.9 G/DL (ref 31–37)
MCV RBC AUTO: 82.1 FL (ref 78–100)
MONOCYTES # BLD: 0.6 K/UL (ref 0.05–1.2)
MONOCYTES NFR BLD: 7 % (ref 3–10)
NEUTS SEG # BLD: 6.3 K/UL (ref 1.8–8)
NEUTS SEG NFR BLD: 70 % (ref 40–73)
NRBC # BLD: 0 K/UL (ref 0–0.01)
NRBC BLD-RTO: 0 PER 100 WBC
PLATELET # BLD AUTO: 312 K/UL (ref 135–420)
PMV BLD AUTO: 10.2 FL (ref 9.2–11.8)
POTASSIUM SERPL-SCNC: 3.8 MMOL/L (ref 3.5–5.5)
PROT SERPL-MCNC: 6.9 G/DL (ref 6.4–8.2)
RBC # BLD AUTO: 4.58 M/UL (ref 4.2–5.3)
SODIUM SERPL-SCNC: 138 MMOL/L (ref 136–145)
WBC # BLD AUTO: 9.1 K/UL (ref 4.6–13.2)

## 2024-08-24 PROCEDURE — 6360000002 HC RX W HCPCS

## 2024-08-24 PROCEDURE — 85025 COMPLETE CBC W/AUTO DIFF WBC: CPT

## 2024-08-24 PROCEDURE — 96374 THER/PROPH/DIAG INJ IV PUSH: CPT

## 2024-08-24 PROCEDURE — 76642 ULTRASOUND BREAST LIMITED: CPT

## 2024-08-24 PROCEDURE — 99284 EMERGENCY DEPT VISIT MOD MDM: CPT

## 2024-08-24 PROCEDURE — 6370000000 HC RX 637 (ALT 250 FOR IP)

## 2024-08-24 PROCEDURE — 80053 COMPREHEN METABOLIC PANEL: CPT

## 2024-08-24 RX ORDER — ACETAMINOPHEN 500 MG
500 TABLET ORAL 4 TIMES DAILY PRN
Qty: 30 TABLET | Refills: 0 | Status: SHIPPED | OUTPATIENT
Start: 2024-08-24

## 2024-08-24 RX ORDER — CLINDAMYCIN HCL 300 MG
300 CAPSULE ORAL 4 TIMES DAILY
Qty: 40 CAPSULE | Refills: 0 | Status: SHIPPED | OUTPATIENT
Start: 2024-08-24 | End: 2024-09-03

## 2024-08-24 RX ORDER — MORPHINE SULFATE 4 MG/ML
4 INJECTION, SOLUTION INTRAMUSCULAR; INTRAVENOUS
Status: COMPLETED | OUTPATIENT
Start: 2024-08-24 | End: 2024-08-24

## 2024-08-24 RX ORDER — KETOROLAC TROMETHAMINE 10 MG/1
10 TABLET, FILM COATED ORAL EVERY 6 HOURS PRN
Qty: 10 TABLET | Refills: 0 | Status: SHIPPED | OUTPATIENT
Start: 2024-08-24

## 2024-08-24 RX ORDER — CLINDAMYCIN HCL 150 MG
300 CAPSULE ORAL
Status: COMPLETED | OUTPATIENT
Start: 2024-08-24 | End: 2024-08-24

## 2024-08-24 RX ADMIN — CLINDAMYCIN HYDROCHLORIDE 300 MG: 150 CAPSULE ORAL at 22:42

## 2024-08-24 RX ADMIN — MORPHINE SULFATE 4 MG: 4 INJECTION, SOLUTION INTRAMUSCULAR; INTRAVENOUS at 19:43

## 2024-08-24 ASSESSMENT — PAIN DESCRIPTION - ORIENTATION: ORIENTATION: RIGHT

## 2024-08-24 ASSESSMENT — PAIN SCALES - GENERAL
PAINLEVEL_OUTOF10: 8
PAINLEVEL_OUTOF10: 8

## 2024-08-24 ASSESSMENT — PAIN - FUNCTIONAL ASSESSMENT: PAIN_FUNCTIONAL_ASSESSMENT: 0-10

## 2024-08-24 ASSESSMENT — PAIN DESCRIPTION - LOCATION: LOCATION: BREAST

## 2024-08-24 NOTE — ED PROVIDER NOTES
EMERGENCY DEPARTMENT HISTORY AND PHYSICAL EXAM      Patient Name: Joi Gerardo  MRN: [id number]  YOB: 1980  Provider: Gloria Zurita PA-C  PCP: Lena Pavon DO   Time/Date of evaluation: 7:35 PM EDT on 24    History of Presenting Illness     Chief Complaint   Patient presents with    Breast Pain       History Provided By: Patient     History (Narative):   Joi Gerardo is a 43 y.o. female with a PMHX of of heroin abuse, hypertension, GERD  who presents to the emergency department  by POV C/O of  breast pain.  Patient was seen here on 819, for mastitis, and discharged on Keflex.  Patient states that her that her symptoms have gotten progressively worse, and her pain is out of proportion.  She states that she has been having mild purulent drainage from the nipple, worsening of the erythema, and tightness of the the breast.  Patient denies any fever, flulike symptoms.  She states that she tried to follow-up with her primary care doctor however her appointment was canceled.  Denies trying to follow-up with breast surgeon  Past History     Past Medical History:  Past Medical History:   Diagnosis Date    Arthritis     SPINE & KNEES    GERD (gastroesophageal reflux disease)     Heroin abuse (HCC)     HTN (hypertension)        Past Surgical History:  Past Surgical History:   Procedure Laterality Date     SECTION      COLONOSCOPY N/A 3/5/2020    DIAGNOSTIC COLONOSCOPY performed by Toney Brennan MD at [facility] ENDOSCOPY    ENDOMETRIAL ABLATION      ALEX-EN-Y GASTRIC BYPASS N/A 3/25/2024    LAPAROSCOPIC GASTRIC BYPASS; LAPAROSCOPIC LIVER WEDGE BIOPSY performed by Bakari Seo MD at [facility] MAIN OR    THYROIDECTOMY, PARTIAL      TUBAL LIGATION         Family History:  Family History   Problem Relation Age of Onset    Hypertension Mother     Hypertension Father        Social History:  Social History     Tobacco Use    Smoking status: Former     Current packs/day: 0.00     Average  Metabolic Panel    Collection Time: 08/24/24  7:41 PM   Result Value Ref Range    Sodium 138 136 - 145 mmol/L    Potassium 3.8 3.5 - 5.5 mmol/L    Chloride 103 100 - 111 mmol/L    CO2 31 21 - 32 mmol/L    Anion Gap 4 3.0 - 18 mmol/L    Glucose 90 74 - 99 mg/dL    BUN 7 7.0 - 18 MG/DL    Creatinine 0.69 0.6 - 1.3 MG/DL    BUN/Creatinine Ratio 10 (L) 12 - 20      Est, Glom Filt Rate >90 >60 ml/min/1.73m2    Calcium 8.6 8.5 - 10.1 MG/DL    Total Bilirubin 0.2 0.2 - 1.0 MG/DL    ALT 12 (L) 13 - 56 U/L    AST 10 10 - 38 U/L    Alk Phosphatase 77 45 - 117 U/L    Total Protein 6.9 6.4 - 8.2 g/dL    Albumin 3.1 (L) 3.4 - 5.0 g/dL    Globulin 3.8 2.0 - 4.0 g/dL    Albumin/Globulin Ratio 0.8 0.8 - 1.7         Radiologic Studies:   US BREAST LIMITED RIGHT   Final Result   Redemonstration of skin thickening with underlying soft tissue edema and   hyperemia in the patient's area of concern in the right breast. No discrete   drainable fluid collection identified to indicate abscess. This is most   consistent with mastitis/cellulitis.      Electronically signed by Geri Simental              Procedures     Procedures    ED Course     7:35 PM EDT I (Gloria Carver PA-C) am the first provider for this patient. Initial assessment performed. I reviewed the vital signs, available nursing notes, past medical history, past surgical history, family history and social history. The patients presenting problems have been discussed, and they are in agreement with the care plan formulated and outlined with them.  I have encouraged them to ask questions as they arise throughout their visit.    Records Reviewed: Nursing Notes and Old Medical Records    Is this patient to be included in the SEP-1 core measure? No Exclusion criteria - the patient is NOT to be included for SEP-1 Core Measure due to: 2+ SIRS criteria are not met    MEDICATIONS ADMINISTERED IN THE ED:  Medications   clindamycin (CLEOCIN) capsule 300 mg (has no administration in

## 2024-08-24 NOTE — ED TRIAGE NOTES
Pt arrives ambulatory reporting having one day left of antibiotic for possible mastitis that she was seen for on 8/19 - reports that it is still painful, red, & swollen  Pt reports PCP canceled her appt d/t being seen in the ER

## 2024-08-25 NOTE — DISCHARGE INSTRUCTIONS
Please continue to apply warm compresses only, please take Tylenol, and Toradol for pain relief, please stop taking the Keflex and start taking the clindamycin.  Please call Dr. Giancarlo nichols on Monday, and return to the ER immediately if worsening or new development of symptoms

## 2024-09-03 ENCOUNTER — HOSPITAL ENCOUNTER (EMERGENCY)
Facility: HOSPITAL | Age: 44
Discharge: HOME | End: 2024-09-03
Payer: MEDICAID

## 2024-09-03 ENCOUNTER — HOSPITAL ENCOUNTER (EMERGENCY)
Facility: HOSPITAL | Age: 44
Discharge: HOME | End: 2024-09-06
Payer: MEDICAID

## 2024-09-03 VITALS
HEIGHT: 67 IN | BODY MASS INDEX: 37.67 KG/M2 | OXYGEN SATURATION: 97 % | TEMPERATURE: 98.4 F | RESPIRATION RATE: 16 BRPM | HEART RATE: 79 BPM | DIASTOLIC BLOOD PRESSURE: 107 MMHG | WEIGHT: 240 LBS | SYSTOLIC BLOOD PRESSURE: 163 MMHG

## 2024-09-03 DIAGNOSIS — R19.7 DIARRHEA, UNSPECIFIED TYPE: ICD-10-CM

## 2024-09-03 DIAGNOSIS — N61.0 CELLULITIS OF RIGHT BREAST: Primary | ICD-10-CM

## 2024-09-03 LAB
ANION GAP SERPL CALC-SCNC: 3 MMOL/L (ref 3–18)
BASOPHILS # BLD: 0 K/UL (ref 0–0.1)
BASOPHILS NFR BLD: 0 % (ref 0–2)
BUN SERPL-MCNC: 7 MG/DL (ref 7–18)
BUN/CREAT SERPL: 11 (ref 12–20)
CALCIUM SERPL-MCNC: 8.3 MG/DL (ref 8.5–10.1)
CHLORIDE SERPL-SCNC: 105 MMOL/L (ref 100–111)
CO2 SERPL-SCNC: 28 MMOL/L (ref 21–32)
CREAT SERPL-MCNC: 0.65 MG/DL (ref 0.6–1.3)
DIFFERENTIAL METHOD BLD: ABNORMAL
EOSINOPHIL # BLD: 0.3 K/UL (ref 0–0.4)
EOSINOPHIL NFR BLD: 2 % (ref 0–5)
ERYTHROCYTE [DISTWIDTH] IN BLOOD BY AUTOMATED COUNT: 15.9 % (ref 11.6–14.5)
GLUCOSE SERPL-MCNC: 77 MG/DL (ref 74–99)
HCT VFR BLD AUTO: 40.8 % (ref 35–45)
HGB BLD-MCNC: 12.9 G/DL (ref 12–16)
IMM GRANULOCYTES # BLD AUTO: 0.1 K/UL (ref 0–0.04)
IMM GRANULOCYTES NFR BLD AUTO: 1 % (ref 0–0.5)
LYMPHOCYTES # BLD: 2.2 K/UL (ref 0.9–3.6)
LYMPHOCYTES NFR BLD: 20 % (ref 21–52)
MCH RBC QN AUTO: 25.7 PG (ref 24–34)
MCHC RBC AUTO-ENTMCNC: 31.6 G/DL (ref 31–37)
MCV RBC AUTO: 81.3 FL (ref 78–100)
MONOCYTES # BLD: 0.7 K/UL (ref 0.05–1.2)
MONOCYTES NFR BLD: 6 % (ref 3–10)
NEUTS SEG # BLD: 8 K/UL (ref 1.8–8)
NEUTS SEG NFR BLD: 71 % (ref 40–73)
NRBC # BLD: 0 K/UL (ref 0–0.01)
NRBC BLD-RTO: 0 PER 100 WBC
PLATELET # BLD AUTO: 448 K/UL (ref 135–420)
PMV BLD AUTO: 9.3 FL (ref 9.2–11.8)
POTASSIUM SERPL-SCNC: 4.5 MMOL/L (ref 3.5–5.5)
RBC # BLD AUTO: 5.02 M/UL (ref 4.2–5.3)
SODIUM SERPL-SCNC: 136 MMOL/L (ref 136–145)
WBC # BLD AUTO: 11.2 K/UL (ref 4.6–13.2)

## 2024-09-03 PROCEDURE — 2580000003 HC RX 258: Performed by: HEALTH CARE PROVIDER

## 2024-09-03 PROCEDURE — 6360000002 HC RX W HCPCS: Performed by: HEALTH CARE PROVIDER

## 2024-09-03 PROCEDURE — 76642 ULTRASOUND BREAST LIMITED: CPT

## 2024-09-03 PROCEDURE — 96374 THER/PROPH/DIAG INJ IV PUSH: CPT

## 2024-09-03 PROCEDURE — 96375 TX/PRO/DX INJ NEW DRUG ADDON: CPT

## 2024-09-03 PROCEDURE — 85025 COMPLETE CBC W/AUTO DIFF WBC: CPT

## 2024-09-03 PROCEDURE — 99284 EMERGENCY DEPT VISIT MOD MDM: CPT

## 2024-09-03 PROCEDURE — 80048 BASIC METABOLIC PNL TOTAL CA: CPT

## 2024-09-03 RX ORDER — ONDANSETRON 2 MG/ML
4 INJECTION INTRAMUSCULAR; INTRAVENOUS
Status: COMPLETED | OUTPATIENT
Start: 2024-09-03 | End: 2024-09-03

## 2024-09-03 RX ORDER — MORPHINE SULFATE 10 MG/ML
6 INJECTION, SOLUTION INTRAMUSCULAR; INTRAVENOUS ONCE
Status: COMPLETED | OUTPATIENT
Start: 2024-09-03 | End: 2024-09-03

## 2024-09-03 RX ADMIN — ONDANSETRON 4 MG: 2 INJECTION, SOLUTION INTRAMUSCULAR; INTRAVENOUS at 21:44

## 2024-09-03 RX ADMIN — WATER 1000 MG: 1 INJECTION INTRAMUSCULAR; INTRAVENOUS; SUBCUTANEOUS at 21:44

## 2024-09-03 RX ADMIN — MORPHINE SULFATE 6 MG: 10 INJECTION, SOLUTION INTRAMUSCULAR; INTRAVENOUS at 21:39

## 2024-09-03 ASSESSMENT — ENCOUNTER SYMPTOMS
SHORTNESS OF BREATH: 0
COLOR CHANGE: 1
ABDOMINAL PAIN: 0
CHEST TIGHTNESS: 0
NAUSEA: 0
DIARRHEA: 0
VOMITING: 0
COUGH: 0

## 2024-09-03 ASSESSMENT — PAIN DESCRIPTION - ORIENTATION: ORIENTATION: RIGHT

## 2024-09-03 ASSESSMENT — PAIN DESCRIPTION - LOCATION: LOCATION: BREAST

## 2024-09-03 ASSESSMENT — PAIN SCALES - GENERAL: PAINLEVEL_OUTOF10: 8

## 2024-09-03 ASSESSMENT — PAIN - FUNCTIONAL ASSESSMENT: PAIN_FUNCTIONAL_ASSESSMENT: 0-10

## 2024-09-03 NOTE — ED TRIAGE NOTES
Patient presented to the Emergency Dept with a complaint of right breast pain, swelling and yellow discharge for the past 3 weeks.    Patient rates pain 8/10 on pain scale    Patient alert and oriented x 4, patient breathes freely on room air in nil cardiopulmonary distress

## 2024-09-04 NOTE — ED PROVIDER NOTES
every 8 (eight) hours for 5 days. Open capsule an take contents Max Daily Amount: 300 mg 15 capsule 0    ursodiol (ACTIGALL) 250 MG tablet Take 1 tablet by mouth in the morning and at bedtime Begin taking 2 weeks after surgery. Take twice daily for 6 months to prevent Gallstone formation. (Patient not taking: Reported on 2024) 180 tablet 1    ondansetron (ZOFRAN-ODT) 8 MG TBDP disintegrating tablet Place 1 tablet under the tongue every 8 hours as needed for Nausea or Vomiting 12 tablet 1    ergocalciferol (ERGOCALCIFEROL) 1.25 MG (52079 UT) capsule Take 1 capsule by mouth every 7 days 4 capsule 3    cloNIDine (CATAPRES) 0.2 MG tablet take 1 tablet by mouth twice a day 60 tablet 2           Past History     Past Medical History:  Past Medical History:   Diagnosis Date    Arthritis     SPINE & KNEES    GERD (gastroesophageal reflux disease)     Heroin abuse (HCC)     HTN (hypertension)        Past Surgical History:  Past Surgical History:   Procedure Laterality Date     SECTION      COLONOSCOPY N/A 3/5/2020    DIAGNOSTIC COLONOSCOPY performed by Toney Brennan MD at [facility] ENDOSCOPY    ENDOMETRIAL ABLATION      ALEX-EN-Y GASTRIC BYPASS N/A 3/25/2024    LAPAROSCOPIC GASTRIC BYPASS; LAPAROSCOPIC LIVER WEDGE BIOPSY performed by Bakari Seo MD at [facility] MAIN OR    THYROIDECTOMY, PARTIAL      TUBAL LIGATION         Family History:  Family History   Problem Relation Age of Onset    Hypertension Mother     Hypertension Father        Social History:  Social History     Tobacco Use    Smoking status: Former     Current packs/day: 0.00     Average packs/day: 1.5 packs/day for 30.0 years (45.0 ttl pk-yrs)     Types: Cigarettes     Start date: 1993     Quit date: 2023     Years since quittin.1     Passive exposure: Never    Smokeless tobacco: Former   Substance Use Topics    Alcohol use: Not Currently    Drug use: Not Currently     Frequency: 5.0 times per week     Types: Heroin, Marijuana

## 2024-09-04 NOTE — DISCHARGE INSTRUCTIONS
Call breast surgeon tomorrow for appointment and management    Return to ED or schedule visit with PCP if signs of infection such as fast heart rate, purulent discharge \"pus\", red streaks, excessive pain or fever develop

## 2024-09-05 ENCOUNTER — TELEPHONE (OUTPATIENT)
Facility: CLINIC | Age: 44
End: 2024-09-05

## 2024-09-05 DIAGNOSIS — I10 ESSENTIAL (PRIMARY) HYPERTENSION: ICD-10-CM

## 2024-09-05 DIAGNOSIS — N61.1 BREAST ABSCESS OF FEMALE: Primary | ICD-10-CM

## 2024-09-05 DIAGNOSIS — N61.1 BREAST ABSCESS OF FEMALE: ICD-10-CM

## 2024-09-05 RX ORDER — CLONIDINE HYDROCHLORIDE 0.2 MG/1
0.2 TABLET ORAL 2 TIMES DAILY
Qty: 180 TABLET | Refills: 0 | Status: SHIPPED | OUTPATIENT
Start: 2024-09-05

## 2024-09-05 RX ORDER — TRAMADOL HYDROCHLORIDE 50 MG/1
50 TABLET ORAL EVERY 4 HOURS PRN
Qty: 30 TABLET | Refills: 0 | Status: SHIPPED | OUTPATIENT
Start: 2024-09-05 | End: 2024-09-10

## 2024-09-05 NOTE — TELEPHONE ENCOUNTER
Spoke with pt in regards to recent breast pain after having a mammogram and developing an abscess. She has been to the ER 3xs for this and started on abx with no improvement and never given any analgesics she has been trying to get in with a breast surgeon and is scheduled to see dr roberts tomorrow however was hoping for a sooner appointment and asked if we could get her over to the [facility] at [facility]. I informed pt that the staff is working to get her a sooner appointment but having difficulty with getting the schedulers over at [facility] to coordinate and answer. Will send Tramadol 50mg q6H PRN for pain along with naloxone. Discussed potential SE including overdose. Pt understands and agrees with plan

## 2024-09-06 ENCOUNTER — HOSPITAL ENCOUNTER (OUTPATIENT)
Facility: HOSPITAL | Age: 44
Setting detail: SPECIMEN
Discharge: HOME | End: 2024-09-09

## 2024-09-06 ENCOUNTER — OFFICE VISIT (OUTPATIENT)
Age: 44
End: 2024-09-06

## 2024-09-06 VITALS
SYSTOLIC BLOOD PRESSURE: 128 MMHG | BODY MASS INDEX: 40.81 KG/M2 | TEMPERATURE: 96.8 F | WEIGHT: 260 LBS | OXYGEN SATURATION: 99 % | HEIGHT: 67 IN | HEART RATE: 93 BPM | RESPIRATION RATE: 16 BRPM | DIASTOLIC BLOOD PRESSURE: 86 MMHG

## 2024-09-06 DIAGNOSIS — N61.1 ABSCESS OF BREAST: ICD-10-CM

## 2024-09-06 DIAGNOSIS — N61.1 ABSCESS OF BREAST: Primary | ICD-10-CM

## 2024-09-06 PROCEDURE — 99001 SPECIMEN HANDLING PT-LAB: CPT

## 2024-09-06 RX ORDER — OXYCODONE AND ACETAMINOPHEN 5; 325 MG/1; MG/1
1 TABLET ORAL EVERY 6 HOURS PRN
Qty: 12 TABLET | Refills: 0 | Status: SHIPPED | OUTPATIENT
Start: 2024-09-06 | End: 2024-09-09

## 2024-09-07 ASSESSMENT — ENCOUNTER SYMPTOMS
SHORTNESS OF BREATH: 0
CHEST TIGHTNESS: 0

## 2024-09-07 NOTE — PROGRESS NOTES
Joi Gerardo is a 43 y.o. female (: 1980)     Chief Complaint   Patient presents with    New Patient     Breast abscess        Medication list and allergies have been reviewed with Joi Gerardo and updated as of today's date.     I have gone over all Medical, Surgical and Social History with Joi Gerardo and updated/added the information accordingly.    
comparing to results calculated using previous equations.  The CKD-EPI equation is less accurate in patients with extremes of muscle mass, extra-renal metabolism of creatinine, excessive creatine ingestion, or following therapy that affects renal tubular secretion.      Calcium 08/19/2024 8.6  8.5 - 10.1 MG/DL Final    Total Bilirubin 08/19/2024 0.4  0.2 - 1.0 MG/DL Final    ALT 08/19/2024 13  13 - 56 U/L Final    AST 08/19/2024 12  10 - 38 U/L Final    Alk Phosphatase 08/19/2024 71  45 - 117 U/L Final    Total Protein 08/19/2024 6.7  6.4 - 8.2 g/dL Final    Albumin 08/19/2024 3.1 (L)  3.4 - 5.0 g/dL Final    Globulin 08/19/2024 3.6  2.0 - 4.0 g/dL Final    Albumin/Globulin Ratio 08/19/2024 0.9  0.8 - 1.7   Final       US BREAST LIMITED RIGHT  Narrative: EXAM: Limited right breast ultrasound    CLINICAL INDICATION/HISTORY: Redness induration right breast, rule out abscess    COMPARISON: August 24, 2024    TECHNIQUE: Selected images from breast ultrasound examination provided for  review.  Includes 2-dimensional gray-scale, color doppler, cinematic images as needed.     FINDINGS:    In the area of clinical concern in the right breast at 7:00, 2 cm from the  nipple, there is a complex collection measuring 3.6 x 3.3 x 4.4 cm. This appears  more well organized than on prior comparison and is concerning for abscess.  Impression: Complex collection in the right breast measuring 3.6 x 3.3 x 4.4 cm is  concerning for breast abscess.    Electronically signed by Dominic Germain         Physical Exam:  /86   Pulse 93   Temp 96.8 °F (36 °C)   Resp 16   Ht 1.702 m (5' 7\")   Wt 117.9 kg (260 lb)   SpO2 99%   BMI 40.72 kg/m²  BMI: Body mass index is 40.72 kg/m².    Physical Exam  Constitutional:       Appearance: Normal appearance.   Cardiovascular:      Rate and Rhythm: Normal rate.   Pulmonary:      Effort: Pulmonary effort is normal.   Skin:     Comments: Moderate to severe swelling and erythema of the right

## 2024-09-09 ENCOUNTER — PREP FOR PROCEDURE (OUTPATIENT)
Age: 44
End: 2024-09-09

## 2024-09-09 DIAGNOSIS — N61.1 ABSCESS OF BREAST: ICD-10-CM

## 2024-09-09 LAB — SENTARA SPECIMEN COLLECTION: NORMAL

## 2024-09-10 ENCOUNTER — HOSPITAL ENCOUNTER (OUTPATIENT)
Facility: HOSPITAL | Age: 44
Setting detail: OUTPATIENT SURGERY
Discharge: HOME | End: 2024-09-10
Attending: SURGERY | Admitting: SURGERY
Payer: MEDICAID

## 2024-09-10 ENCOUNTER — ANESTHESIA (OUTPATIENT)
Facility: HOSPITAL | Age: 44
End: 2024-09-10
Payer: MEDICAID

## 2024-09-10 ENCOUNTER — ANESTHESIA EVENT (OUTPATIENT)
Facility: HOSPITAL | Age: 44
End: 2024-09-10
Payer: MEDICAID

## 2024-09-10 VITALS
BODY MASS INDEX: 39.99 KG/M2 | OXYGEN SATURATION: 98 % | RESPIRATION RATE: 18 BRPM | DIASTOLIC BLOOD PRESSURE: 100 MMHG | HEART RATE: 86 BPM | SYSTOLIC BLOOD PRESSURE: 144 MMHG | HEIGHT: 67 IN | TEMPERATURE: 98.6 F | WEIGHT: 254.8 LBS

## 2024-09-10 DIAGNOSIS — N61.1 ABSCESS OF BREAST: Primary | ICD-10-CM

## 2024-09-10 LAB — HCG UR QL: NEGATIVE

## 2024-09-10 PROCEDURE — 7100000000 HC PACU RECOVERY - FIRST 15 MIN: Performed by: SURGERY

## 2024-09-10 PROCEDURE — 6360000002 HC RX W HCPCS: Performed by: NURSE ANESTHETIST, CERTIFIED REGISTERED

## 2024-09-10 PROCEDURE — 6360000002 HC RX W HCPCS: Performed by: SURGERY

## 2024-09-10 PROCEDURE — 87205 SMEAR GRAM STAIN: CPT

## 2024-09-10 PROCEDURE — 3700000000 HC ANESTHESIA ATTENDED CARE: Performed by: SURGERY

## 2024-09-10 PROCEDURE — 2720000010 HC SURG SUPPLY STERILE: Performed by: SURGERY

## 2024-09-10 PROCEDURE — 2500000003 HC RX 250 WO HCPCS: Performed by: NURSE ANESTHETIST, CERTIFIED REGISTERED

## 2024-09-10 PROCEDURE — 81025 URINE PREGNANCY TEST: CPT

## 2024-09-10 PROCEDURE — 7100000001 HC PACU RECOVERY - ADDTL 15 MIN: Performed by: SURGERY

## 2024-09-10 PROCEDURE — 87077 CULTURE AEROBIC IDENTIFY: CPT

## 2024-09-10 PROCEDURE — 3700000001 HC ADD 15 MINUTES (ANESTHESIA): Performed by: SURGERY

## 2024-09-10 PROCEDURE — 7100000011 HC PHASE II RECOVERY - ADDTL 15 MIN: Performed by: SURGERY

## 2024-09-10 PROCEDURE — 87070 CULTURE OTHR SPECIMN AEROBIC: CPT

## 2024-09-10 PROCEDURE — 3600000002 HC SURGERY LEVEL 2 BASE: Performed by: SURGERY

## 2024-09-10 PROCEDURE — 3600000012 HC SURGERY LEVEL 2 ADDTL 15MIN: Performed by: SURGERY

## 2024-09-10 PROCEDURE — 2709999900 HC NON-CHARGEABLE SUPPLY: Performed by: SURGERY

## 2024-09-10 PROCEDURE — 10061 I&D ABSCESS COMP/MULTIPLE: CPT | Performed by: SURGERY

## 2024-09-10 PROCEDURE — 87186 SC STD MICRODIL/AGAR DIL: CPT

## 2024-09-10 PROCEDURE — 87075 CULTR BACTERIA EXCEPT BLOOD: CPT

## 2024-09-10 PROCEDURE — 7100000010 HC PHASE II RECOVERY - FIRST 15 MIN: Performed by: SURGERY

## 2024-09-10 PROCEDURE — 2580000003 HC RX 258: Performed by: SURGERY

## 2024-09-10 PROCEDURE — 2500000003 HC RX 250 WO HCPCS: Performed by: SURGERY

## 2024-09-10 RX ORDER — NALOXONE HYDROCHLORIDE 0.4 MG/ML
INJECTION, SOLUTION INTRAMUSCULAR; INTRAVENOUS; SUBCUTANEOUS PRN
Status: DISCONTINUED | OUTPATIENT
Start: 2024-09-10 | End: 2024-09-10 | Stop reason: HOSPADM

## 2024-09-10 RX ORDER — ONDANSETRON 2 MG/ML
INJECTION INTRAMUSCULAR; INTRAVENOUS PRN
Status: DISCONTINUED | OUTPATIENT
Start: 2024-09-10 | End: 2024-09-10 | Stop reason: SDUPTHER

## 2024-09-10 RX ORDER — SODIUM CHLORIDE 0.9 % (FLUSH) 0.9 %
5-40 SYRINGE (ML) INJECTION EVERY 12 HOURS SCHEDULED
Status: DISCONTINUED | OUTPATIENT
Start: 2024-09-10 | End: 2024-09-10 | Stop reason: HOSPADM

## 2024-09-10 RX ORDER — PROPOFOL 10 MG/ML
INJECTION, EMULSION INTRAVENOUS PRN
Status: DISCONTINUED | OUTPATIENT
Start: 2024-09-10 | End: 2024-09-10 | Stop reason: SDUPTHER

## 2024-09-10 RX ORDER — SODIUM CHLORIDE 0.9 % (FLUSH) 0.9 %
5-40 SYRINGE (ML) INJECTION PRN
Status: DISCONTINUED | OUTPATIENT
Start: 2024-09-10 | End: 2024-09-10 | Stop reason: HOSPADM

## 2024-09-10 RX ORDER — FENTANYL CITRATE 50 UG/ML
50 INJECTION, SOLUTION INTRAMUSCULAR; INTRAVENOUS EVERY 5 MIN PRN
Status: COMPLETED | OUTPATIENT
Start: 2024-09-10 | End: 2024-09-10

## 2024-09-10 RX ORDER — DEXTROSE MONOHYDRATE 100 MG/ML
INJECTION, SOLUTION INTRAVENOUS CONTINUOUS PRN
Status: DISCONTINUED | OUTPATIENT
Start: 2024-09-10 | End: 2024-09-10 | Stop reason: HOSPADM

## 2024-09-10 RX ORDER — ONDANSETRON 2 MG/ML
4 INJECTION INTRAMUSCULAR; INTRAVENOUS
Status: DISCONTINUED | OUTPATIENT
Start: 2024-09-10 | End: 2024-09-10 | Stop reason: HOSPADM

## 2024-09-10 RX ORDER — OXYCODONE AND ACETAMINOPHEN 5; 325 MG/1; MG/1
1 TABLET ORAL EVERY 6 HOURS PRN
Qty: 15 TABLET | Refills: 0 | Status: SHIPPED | OUTPATIENT
Start: 2024-09-10 | End: 2024-09-13

## 2024-09-10 RX ORDER — FENTANYL CITRATE 50 UG/ML
INJECTION, SOLUTION INTRAMUSCULAR; INTRAVENOUS PRN
Status: DISCONTINUED | OUTPATIENT
Start: 2024-09-10 | End: 2024-09-10 | Stop reason: SDUPTHER

## 2024-09-10 RX ORDER — MIDAZOLAM HYDROCHLORIDE 1 MG/ML
INJECTION INTRAMUSCULAR; INTRAVENOUS PRN
Status: DISCONTINUED | OUTPATIENT
Start: 2024-09-10 | End: 2024-09-10 | Stop reason: SDUPTHER

## 2024-09-10 RX ORDER — LIDOCAINE HYDROCHLORIDE 20 MG/ML
INJECTION, SOLUTION EPIDURAL; INFILTRATION; INTRACAUDAL; PERINEURAL PRN
Status: DISCONTINUED | OUTPATIENT
Start: 2024-09-10 | End: 2024-09-10 | Stop reason: SDUPTHER

## 2024-09-10 RX ORDER — DIPHENHYDRAMINE HYDROCHLORIDE 50 MG/ML
12.5 INJECTION INTRAMUSCULAR; INTRAVENOUS
Status: DISCONTINUED | OUTPATIENT
Start: 2024-09-10 | End: 2024-09-10 | Stop reason: HOSPADM

## 2024-09-10 RX ORDER — DEXAMETHASONE SODIUM PHOSPHATE 4 MG/ML
INJECTION, SOLUTION INTRA-ARTICULAR; INTRALESIONAL; INTRAMUSCULAR; INTRAVENOUS; SOFT TISSUE PRN
Status: DISCONTINUED | OUTPATIENT
Start: 2024-09-10 | End: 2024-09-10 | Stop reason: SDUPTHER

## 2024-09-10 RX ORDER — SODIUM CHLORIDE 9 MG/ML
INJECTION, SOLUTION INTRAVENOUS PRN
Status: DISCONTINUED | OUTPATIENT
Start: 2024-09-10 | End: 2024-09-10 | Stop reason: HOSPADM

## 2024-09-10 RX ORDER — GLUCAGON 1 MG
1 KIT INJECTION PRN
Status: DISCONTINUED | OUTPATIENT
Start: 2024-09-10 | End: 2024-09-10 | Stop reason: HOSPADM

## 2024-09-10 RX ADMIN — MIDAZOLAM 2 MG: 1 INJECTION, SOLUTION INTRAMUSCULAR; INTRAVENOUS at 14:06

## 2024-09-10 RX ADMIN — FENTANYL CITRATE 50 MCG: 50 INJECTION INTRAMUSCULAR; INTRAVENOUS at 14:20

## 2024-09-10 RX ADMIN — SODIUM CHLORIDE: 9 INJECTION, SOLUTION INTRAVENOUS at 13:31

## 2024-09-10 RX ADMIN — HYDROMORPHONE HYDROCHLORIDE 0.25 MG: 1 INJECTION, SOLUTION INTRAMUSCULAR; INTRAVENOUS; SUBCUTANEOUS at 15:23

## 2024-09-10 RX ADMIN — FENTANYL CITRATE 50 MCG: 0.05 INJECTION, SOLUTION INTRAMUSCULAR; INTRAVENOUS at 15:02

## 2024-09-10 RX ADMIN — PROPOFOL 30 MG: 10 INJECTION, EMULSION INTRAVENOUS at 14:19

## 2024-09-10 RX ADMIN — FENTANYL CITRATE 100 MCG: 50 INJECTION INTRAMUSCULAR; INTRAVENOUS at 14:44

## 2024-09-10 RX ADMIN — FENTANYL CITRATE 50 MCG: 0.05 INJECTION, SOLUTION INTRAMUSCULAR; INTRAVENOUS at 15:14

## 2024-09-10 RX ADMIN — LIDOCAINE HYDROCHLORIDE 100 MG: 20 INJECTION, SOLUTION EPIDURAL; INFILTRATION; INTRACAUDAL; PERINEURAL at 14:12

## 2024-09-10 RX ADMIN — PROPOFOL 170 MG: 10 INJECTION, EMULSION INTRAVENOUS at 14:12

## 2024-09-10 RX ADMIN — VANCOMYCIN HYDROCHLORIDE 1500 MG: 10 INJECTION, POWDER, LYOPHILIZED, FOR SOLUTION INTRAVENOUS at 13:32

## 2024-09-10 RX ADMIN — DEXAMETHASONE SODIUM PHOSPHATE 4 MG: 4 INJECTION INTRA-ARTICULAR; INTRALESIONAL; INTRAMUSCULAR; INTRAVENOUS; SOFT TISSUE at 14:12

## 2024-09-10 RX ADMIN — FENTANYL CITRATE 50 MCG: 50 INJECTION INTRAMUSCULAR; INTRAVENOUS at 14:12

## 2024-09-10 RX ADMIN — ONDANSETRON 4 MG: 2 INJECTION INTRAMUSCULAR; INTRAVENOUS at 14:31

## 2024-09-10 ASSESSMENT — PAIN DESCRIPTION - DESCRIPTORS
DESCRIPTORS: ACHING;BURNING
DESCRIPTORS: BURNING;DULL
DESCRIPTORS: BURNING

## 2024-09-10 ASSESSMENT — PAIN DESCRIPTION - LOCATION
LOCATION: BREAST

## 2024-09-10 ASSESSMENT — PAIN - FUNCTIONAL ASSESSMENT
PAIN_FUNCTIONAL_ASSESSMENT: ACTIVITIES ARE NOT PREVENTED

## 2024-09-10 ASSESSMENT — PAIN SCALES - GENERAL
PAINLEVEL_OUTOF10: 9
PAINLEVEL_OUTOF10: 5
PAINLEVEL_OUTOF10: 7
PAINLEVEL_OUTOF10: 9
PAINLEVEL_OUTOF10: 6
PAINLEVEL_OUTOF10: 7
PAINLEVEL_OUTOF10: 6

## 2024-09-10 ASSESSMENT — PAIN DESCRIPTION - ORIENTATION
ORIENTATION: RIGHT

## 2024-09-10 ASSESSMENT — PAIN DESCRIPTION - PAIN TYPE
TYPE: SURGICAL PAIN

## 2024-09-12 LAB
BACTERIA SPEC CULT: NORMAL
SERVICE CMNT-IMP: NORMAL

## 2024-09-13 ENCOUNTER — HOSPITAL ENCOUNTER (EMERGENCY)
Facility: HOSPITAL | Age: 44
Discharge: HOME | End: 2024-09-13
Attending: STUDENT IN AN ORGANIZED HEALTH CARE EDUCATION/TRAINING PROGRAM
Payer: MEDICAID

## 2024-09-13 ENCOUNTER — APPOINTMENT (OUTPATIENT)
Facility: HOSPITAL | Age: 44
End: 2024-09-13
Payer: MEDICAID

## 2024-09-13 VITALS
HEIGHT: 67 IN | OXYGEN SATURATION: 97 % | HEART RATE: 59 BPM | SYSTOLIC BLOOD PRESSURE: 146 MMHG | RESPIRATION RATE: 18 BRPM | BODY MASS INDEX: 40 KG/M2 | DIASTOLIC BLOOD PRESSURE: 75 MMHG | TEMPERATURE: 98.5 F | WEIGHT: 254.85 LBS

## 2024-09-13 DIAGNOSIS — R11.2 NAUSEA AND VOMITING, UNSPECIFIED VOMITING TYPE: Primary | ICD-10-CM

## 2024-09-13 DIAGNOSIS — R10.84 GENERALIZED ABDOMINAL PAIN: ICD-10-CM

## 2024-09-13 LAB
ALBUMIN SERPL-MCNC: 3.3 G/DL (ref 3.4–5)
ALBUMIN/GLOB SERPL: 0.9 (ref 0.8–1.7)
ALP SERPL-CCNC: 78 U/L (ref 45–117)
ALT SERPL-CCNC: 16 U/L (ref 13–56)
ANION GAP SERPL CALC-SCNC: 7 MMOL/L (ref 3–18)
AST SERPL-CCNC: 16 U/L (ref 10–38)
BACTERIA SPEC CULT: ABNORMAL
BASOPHILS # BLD: 0 K/UL (ref 0–0.1)
BASOPHILS NFR BLD: 0 % (ref 0–2)
BILIRUB SERPL-MCNC: 0.3 MG/DL (ref 0.2–1)
BUN SERPL-MCNC: 7 MG/DL (ref 7–18)
BUN/CREAT SERPL: 10 (ref 12–20)
CALCIUM SERPL-MCNC: 9.1 MG/DL (ref 8.5–10.1)
CHLORIDE SERPL-SCNC: 103 MMOL/L (ref 100–111)
CO2 SERPL-SCNC: 28 MMOL/L (ref 21–32)
CREAT SERPL-MCNC: 0.7 MG/DL (ref 0.6–1.3)
DIFFERENTIAL METHOD BLD: ABNORMAL
EOSINOPHIL # BLD: 0.3 K/UL (ref 0–0.4)
EOSINOPHIL NFR BLD: 3 % (ref 0–5)
ERYTHROCYTE [DISTWIDTH] IN BLOOD BY AUTOMATED COUNT: 15.6 % (ref 11.6–14.5)
GLOBULIN SER CALC-MCNC: 3.8 G/DL (ref 2–4)
GLUCOSE SERPL-MCNC: 110 MG/DL (ref 74–99)
GRAM STN SPEC: ABNORMAL
GRAM STN SPEC: ABNORMAL
HCG SERPL QL: NEGATIVE
HCT VFR BLD AUTO: 38.8 % (ref 35–45)
HGB BLD-MCNC: 12.6 G/DL (ref 12–16)
IMM GRANULOCYTES # BLD AUTO: 0 K/UL (ref 0–0.04)
IMM GRANULOCYTES NFR BLD AUTO: 0 % (ref 0–0.5)
LIPASE SERPL-CCNC: 20 U/L (ref 13–75)
LYMPHOCYTES # BLD: 3.4 K/UL (ref 0.9–3.6)
LYMPHOCYTES NFR BLD: 35 % (ref 21–52)
MAGNESIUM SERPL-MCNC: 1.7 MG/DL (ref 1.6–2.6)
MCH RBC QN AUTO: 26.1 PG (ref 24–34)
MCHC RBC AUTO-ENTMCNC: 32.5 G/DL (ref 31–37)
MCV RBC AUTO: 80.3 FL (ref 78–100)
MONOCYTES # BLD: 0.6 K/UL (ref 0.05–1.2)
MONOCYTES NFR BLD: 6 % (ref 3–10)
NEUTS SEG # BLD: 5.5 K/UL (ref 1.8–8)
NEUTS SEG NFR BLD: 55 % (ref 40–73)
NRBC # BLD: 0 K/UL (ref 0–0.01)
NRBC BLD-RTO: 0 PER 100 WBC
PHOSPHATE SERPL-MCNC: 3.3 MG/DL (ref 2.5–4.9)
PLATELET # BLD AUTO: 401 K/UL (ref 135–420)
PMV BLD AUTO: 9.4 FL (ref 9.2–11.8)
POTASSIUM SERPL-SCNC: 3.8 MMOL/L (ref 3.5–5.5)
PROT SERPL-MCNC: 7.1 G/DL (ref 6.4–8.2)
RBC # BLD AUTO: 4.83 M/UL (ref 4.2–5.3)
SERVICE CMNT-IMP: ABNORMAL
SODIUM SERPL-SCNC: 138 MMOL/L (ref 136–145)
WBC # BLD AUTO: 9.9 K/UL (ref 4.6–13.2)

## 2024-09-13 PROCEDURE — 83735 ASSAY OF MAGNESIUM: CPT

## 2024-09-13 PROCEDURE — 76705 ECHO EXAM OF ABDOMEN: CPT

## 2024-09-13 PROCEDURE — 80053 COMPREHEN METABOLIC PANEL: CPT

## 2024-09-13 PROCEDURE — 84703 CHORIONIC GONADOTROPIN ASSAY: CPT

## 2024-09-13 PROCEDURE — 2580000003 HC RX 258: Performed by: STUDENT IN AN ORGANIZED HEALTH CARE EDUCATION/TRAINING PROGRAM

## 2024-09-13 PROCEDURE — 96375 TX/PRO/DX INJ NEW DRUG ADDON: CPT

## 2024-09-13 PROCEDURE — 6360000002 HC RX W HCPCS: Performed by: STUDENT IN AN ORGANIZED HEALTH CARE EDUCATION/TRAINING PROGRAM

## 2024-09-13 PROCEDURE — 99285 EMERGENCY DEPT VISIT HI MDM: CPT

## 2024-09-13 PROCEDURE — 96374 THER/PROPH/DIAG INJ IV PUSH: CPT

## 2024-09-13 PROCEDURE — 74177 CT ABD & PELVIS W/CONTRAST: CPT

## 2024-09-13 PROCEDURE — 83690 ASSAY OF LIPASE: CPT

## 2024-09-13 PROCEDURE — 96376 TX/PRO/DX INJ SAME DRUG ADON: CPT

## 2024-09-13 PROCEDURE — 6360000004 HC RX CONTRAST MEDICATION: Performed by: STUDENT IN AN ORGANIZED HEALTH CARE EDUCATION/TRAINING PROGRAM

## 2024-09-13 PROCEDURE — 85025 COMPLETE CBC W/AUTO DIFF WBC: CPT

## 2024-09-13 PROCEDURE — 84100 ASSAY OF PHOSPHORUS: CPT

## 2024-09-13 RX ORDER — MORPHINE SULFATE 4 MG/ML
4 INJECTION, SOLUTION INTRAMUSCULAR; INTRAVENOUS
Status: DISCONTINUED | OUTPATIENT
Start: 2024-09-13 | End: 2024-09-13

## 2024-09-13 RX ORDER — IOPAMIDOL 612 MG/ML
85 INJECTION, SOLUTION INTRAVASCULAR
Status: COMPLETED | OUTPATIENT
Start: 2024-09-13 | End: 2024-09-13

## 2024-09-13 RX ORDER — MORPHINE SULFATE 10 MG/ML
8 INJECTION, SOLUTION INTRAMUSCULAR; INTRAVENOUS
Status: COMPLETED | OUTPATIENT
Start: 2024-09-13 | End: 2024-09-13

## 2024-09-13 RX ORDER — SODIUM CHLORIDE, SODIUM LACTATE, POTASSIUM CHLORIDE, AND CALCIUM CHLORIDE .6; .31; .03; .02 G/100ML; G/100ML; G/100ML; G/100ML
1000 INJECTION, SOLUTION INTRAVENOUS ONCE
Status: COMPLETED | OUTPATIENT
Start: 2024-09-13 | End: 2024-09-13

## 2024-09-13 RX ORDER — FENTANYL CITRATE 50 UG/ML
50 INJECTION, SOLUTION INTRAMUSCULAR; INTRAVENOUS
Status: COMPLETED | OUTPATIENT
Start: 2024-09-13 | End: 2024-09-13

## 2024-09-13 RX ORDER — ONDANSETRON 2 MG/ML
4 INJECTION INTRAMUSCULAR; INTRAVENOUS
Status: COMPLETED | OUTPATIENT
Start: 2024-09-13 | End: 2024-09-13

## 2024-09-13 RX ADMIN — MORPHINE SULFATE 8 MG: 10 INJECTION, SOLUTION INTRAMUSCULAR; INTRAVENOUS at 02:45

## 2024-09-13 RX ADMIN — FENTANYL CITRATE 50 MCG: 50 INJECTION INTRAMUSCULAR; INTRAVENOUS at 04:36

## 2024-09-13 RX ADMIN — ONDANSETRON 4 MG: 2 INJECTION INTRAMUSCULAR; INTRAVENOUS at 04:53

## 2024-09-13 RX ADMIN — SODIUM CHLORIDE, POTASSIUM CHLORIDE, SODIUM LACTATE AND CALCIUM CHLORIDE 1000 ML: 600; 310; 30; 20 INJECTION, SOLUTION INTRAVENOUS at 02:31

## 2024-09-13 RX ADMIN — ONDANSETRON 4 MG: 2 INJECTION INTRAMUSCULAR; INTRAVENOUS at 02:29

## 2024-09-13 RX ADMIN — IOPAMIDOL 85 ML: 612 INJECTION, SOLUTION INTRAVENOUS at 04:06

## 2024-09-13 ASSESSMENT — PAIN SCALES - GENERAL
PAINLEVEL_OUTOF10: 7

## 2024-09-13 ASSESSMENT — PAIN DESCRIPTION - LOCATION
LOCATION: ABDOMEN

## 2024-09-13 ASSESSMENT — PAIN DESCRIPTION - DESCRIPTORS
DESCRIPTORS: THROBBING;SHARP
DESCRIPTORS: THROBBING;SHARP;POUNDING

## 2024-09-13 ASSESSMENT — PAIN - FUNCTIONAL ASSESSMENT: PAIN_FUNCTIONAL_ASSESSMENT: 0-10

## 2024-09-15 LAB
RESULT: ABNORMAL
RESULT: ABNORMAL

## 2024-09-17 ENCOUNTER — OFFICE VISIT (OUTPATIENT)
Facility: CLINIC | Age: 44
End: 2024-09-17
Payer: MEDICAID

## 2024-09-17 VITALS
HEIGHT: 67 IN | OXYGEN SATURATION: 98 % | TEMPERATURE: 97.1 F | RESPIRATION RATE: 18 BRPM | BODY MASS INDEX: 38.61 KG/M2 | HEART RATE: 85 BPM | DIASTOLIC BLOOD PRESSURE: 76 MMHG | SYSTOLIC BLOOD PRESSURE: 114 MMHG | WEIGHT: 246 LBS

## 2024-09-17 DIAGNOSIS — T88.59XA NAUSEA AFTER ANESTHESIA, INITIAL ENCOUNTER: ICD-10-CM

## 2024-09-17 DIAGNOSIS — N61.1 BREAST ABSCESS OF FEMALE: Primary | ICD-10-CM

## 2024-09-17 DIAGNOSIS — R11.0 NAUSEA AFTER ANESTHESIA, INITIAL ENCOUNTER: ICD-10-CM

## 2024-09-17 DIAGNOSIS — I10 ESSENTIAL (PRIMARY) HYPERTENSION: ICD-10-CM

## 2024-09-17 PROCEDURE — 99214 OFFICE O/P EST MOD 30 MIN: CPT | Performed by: STUDENT IN AN ORGANIZED HEALTH CARE EDUCATION/TRAINING PROGRAM

## 2024-09-17 PROCEDURE — 3078F DIAST BP <80 MM HG: CPT | Performed by: STUDENT IN AN ORGANIZED HEALTH CARE EDUCATION/TRAINING PROGRAM

## 2024-09-17 PROCEDURE — 3074F SYST BP LT 130 MM HG: CPT | Performed by: STUDENT IN AN ORGANIZED HEALTH CARE EDUCATION/TRAINING PROGRAM

## 2024-09-17 RX ORDER — ONDANSETRON 4 MG/1
4 TABLET, FILM COATED ORAL DAILY PRN
Qty: 30 TABLET | Refills: 0 | Status: SHIPPED | OUTPATIENT
Start: 2024-09-17

## 2024-09-17 SDOH — ECONOMIC STABILITY: FOOD INSECURITY: WITHIN THE PAST 12 MONTHS, YOU WORRIED THAT YOUR FOOD WOULD RUN OUT BEFORE YOU GOT MONEY TO BUY MORE.: NEVER TRUE

## 2024-09-17 SDOH — ECONOMIC STABILITY: INCOME INSECURITY: HOW HARD IS IT FOR YOU TO PAY FOR THE VERY BASICS LIKE FOOD, HOUSING, MEDICAL CARE, AND HEATING?: NOT HARD AT ALL

## 2024-09-17 SDOH — ECONOMIC STABILITY: FOOD INSECURITY: WITHIN THE PAST 12 MONTHS, THE FOOD YOU BOUGHT JUST DIDN'T LAST AND YOU DIDN'T HAVE MONEY TO GET MORE.: NEVER TRUE

## 2024-09-17 ASSESSMENT — ENCOUNTER SYMPTOMS
SHORTNESS OF BREATH: 0
ABDOMINAL PAIN: 0

## 2024-09-23 ENCOUNTER — OFFICE VISIT (OUTPATIENT)
Age: 44
End: 2024-09-23

## 2024-09-23 VITALS
SYSTOLIC BLOOD PRESSURE: 130 MMHG | BODY MASS INDEX: 39.15 KG/M2 | WEIGHT: 249.4 LBS | TEMPERATURE: 97.5 F | HEART RATE: 90 BPM | RESPIRATION RATE: 16 BRPM | OXYGEN SATURATION: 96 % | HEIGHT: 67 IN | DIASTOLIC BLOOD PRESSURE: 84 MMHG

## 2024-09-23 DIAGNOSIS — N61.1 ABSCESS OF BREAST: Primary | ICD-10-CM

## 2024-09-23 PROCEDURE — 99024 POSTOP FOLLOW-UP VISIT: CPT | Performed by: SURGERY

## 2024-09-23 RX ORDER — OXYCODONE AND ACETAMINOPHEN 5; 325 MG/1; MG/1
1 TABLET ORAL EVERY 6 HOURS PRN
Qty: 12 TABLET | Refills: 0 | Status: SHIPPED | OUTPATIENT
Start: 2024-09-23 | End: 2024-09-26

## 2024-10-09 ENCOUNTER — OFFICE VISIT (OUTPATIENT)
Age: 44
End: 2024-10-09

## 2024-10-09 ENCOUNTER — HOSPITAL ENCOUNTER (OUTPATIENT)
Facility: HOSPITAL | Age: 44
Discharge: HOME | End: 2024-10-12
Payer: MEDICAID

## 2024-10-09 VITALS
HEIGHT: 67 IN | WEIGHT: 248.6 LBS | OXYGEN SATURATION: 91 % | SYSTOLIC BLOOD PRESSURE: 130 MMHG | BODY MASS INDEX: 39.02 KG/M2 | DIASTOLIC BLOOD PRESSURE: 80 MMHG | HEART RATE: 71 BPM | RESPIRATION RATE: 16 BRPM | TEMPERATURE: 96.8 F

## 2024-10-09 DIAGNOSIS — N61.1 ABSCESS OF BREAST: ICD-10-CM

## 2024-10-09 DIAGNOSIS — N61.1 ABSCESS OF BREAST: Primary | ICD-10-CM

## 2024-10-09 PROCEDURE — 99024 POSTOP FOLLOW-UP VISIT: CPT | Performed by: SURGERY

## 2024-10-09 PROCEDURE — 76642 ULTRASOUND BREAST LIMITED: CPT

## 2024-10-09 RX ORDER — GABAPENTIN 300 MG/1
300 CAPSULE ORAL 3 TIMES DAILY
Qty: 30 CAPSULE | Refills: 0 | Status: SHIPPED | OUTPATIENT
Start: 2024-10-09 | End: 2024-11-19

## 2024-10-10 ENCOUNTER — TELEPHONE (OUTPATIENT)
Age: 44
End: 2024-10-10

## 2024-10-10 RX ORDER — GABAPENTIN 300 MG/1
300 CAPSULE ORAL 3 TIMES DAILY PRN
Qty: 30 CAPSULE | Refills: 0 | Status: SHIPPED | OUTPATIENT
Start: 2024-10-10 | End: 2024-11-30

## 2024-10-10 RX ORDER — GABAPENTIN 300 MG/1
300 CAPSULE ORAL 3 TIMES DAILY
Qty: 30 CAPSULE | Refills: 0 | OUTPATIENT
Start: 2024-10-10 | End: 2024-11-20

## 2024-10-10 NOTE — PROGRESS NOTES
Joi Gerardo is a 43 y.o. female (: 1980)     Chief Complaint   Patient presents with    Follow-up     Right breast abscess 09/10       Medication list and allergies have been reviewed with Joi Gerardo and updated as of today's date.     I have gone over all Medical, Surgical and Social History with Masonharshadángel LIVINGSTON Akin and updated/added the information accordingly.     1. Have you been to the ER, urgent care clinic since your last visit?  Hospitalized since your last visit?No    2. Have you seen or consulted any other health care providers outside of the [facility] since your last visit?  Include any pap smears or colon screening. No   
was interval drained.    In this region 6:00, 1 to 2 cm from the nipple there is an irregular complex  mixed echogenicity fluid collection measuring 4.4 x 1.2 x 2.5 cm with some rim  vascularity concerning for recurrent abscess.    Multiple prominent right axillary lymph nodes noted with cortices measuring up  to 6 mm highly likely reactive.  Impression: 4.4 cm irregular complex mixed echogenicity fluid collection at the site of  palpable concern 6:00, 1 to 2 cm from the nipple with some peripheral rim  vascularity concerning for recurrent abscess.    Clinical follow-up recommended. Suggest sonographic follow-up in 2 to 3 months  to ensure resolution.    BIRADS: BIRADS :  Category 3: Probably benign      Electronically signed by Goldie Middleton         Physical Exam:  /80   Pulse 71   Temp 96.8 °F (36 °C)   Resp 16   Ht 1.702 m (5' 7\")   Wt 112.8 kg (248 lb 9.6 oz)   SpO2 91%   BMI 38.94 kg/m²  BMI: Body mass index is 38.94 kg/m².    Physical Exam  Skin:     Comments: Moderate induration of the right breast, incision clean, dry, intact  Appropriately tender, no erythema         I have reviewed the information entered by the clinical staff and/or patient and verified it as accurate or edited where necessary.     Electronically signed by:    Shabana Medrano DO, MPH

## 2024-10-10 NOTE — TELEPHONE ENCOUNTER
----- Message from Dr. Shabana Medrano DO sent at 10/10/2024  1:20 PM EDT -----  Can you let her know I believe this is seroma collection on ultrasound but I called in neurontin and additional 7 days of antibiotics to be cautious. Lets see her back with reynaldo in 2 weeks but she should call before then if there is a problem  ----- Message -----  From: Joseph Sandoval Incoming Orders Results To [facility]  Sent: 10/9/2024   4:12 PM EDT  To: Shabana Medrano DO

## 2024-10-10 NOTE — TELEPHONE ENCOUNTER
Pt aware, states she cannot take gabapentin capsules due to gastric bypass  asking if you can send in oxycodone again instead. Pt scheduled for 2 week f/u with Raya.

## 2024-10-15 ENCOUNTER — PATIENT MESSAGE (OUTPATIENT)
Age: 44
End: 2024-10-15

## 2024-10-16 ENCOUNTER — OFFICE VISIT (OUTPATIENT)
Age: 44
End: 2024-10-16

## 2024-10-16 ENCOUNTER — TELEPHONE (OUTPATIENT)
Age: 44
End: 2024-10-16

## 2024-10-16 ENCOUNTER — PREP FOR PROCEDURE (OUTPATIENT)
Age: 44
End: 2024-10-16

## 2024-10-16 ENCOUNTER — ANESTHESIA EVENT (OUTPATIENT)
Facility: HOSPITAL | Age: 44
End: 2024-10-16
Payer: MEDICAID

## 2024-10-16 VITALS
HEART RATE: 83 BPM | SYSTOLIC BLOOD PRESSURE: 138 MMHG | WEIGHT: 249.8 LBS | BODY MASS INDEX: 39.21 KG/M2 | DIASTOLIC BLOOD PRESSURE: 84 MMHG | HEIGHT: 67 IN | TEMPERATURE: 98 F

## 2024-10-16 DIAGNOSIS — Z72.0 TOBACCO ABUSE: ICD-10-CM

## 2024-10-16 DIAGNOSIS — N61.1 ABSCESS OF BREAST: Primary | ICD-10-CM

## 2024-10-16 DIAGNOSIS — N60.41 PERIDUCTAL MASTITIS OF RIGHT BREAST: ICD-10-CM

## 2024-10-16 PROCEDURE — 99024 POSTOP FOLLOW-UP VISIT: CPT

## 2024-10-16 RX ORDER — HYDROCODONE BITARTRATE AND ACETAMINOPHEN 5; 325 MG/1; MG/1
1 TABLET ORAL EVERY 4 HOURS PRN
Qty: 18 TABLET | Refills: 0 | Status: SHIPPED | OUTPATIENT
Start: 2024-10-16 | End: 2024-10-19

## 2024-10-16 ASSESSMENT — ENCOUNTER SYMPTOMS
SHORTNESS OF BREATH: 0
CHEST TIGHTNESS: 0

## 2024-10-16 NOTE — PROGRESS NOTES
CC:   Chief Complaint   Patient presents with   • Follow-up     Right breast pain/abscess;rate 8;has ruptured         Assessment:    ICD-10-CM    1. Abscess of breast  N61.1 SCHEDULE SURGERY     HYDROcodone-acetaminophen (NORCO) 5-325 MG per tablet      2. Tobacco abuse  Z72.0       3. Periductal mastitis of right breast  N60.41           Plan: She has moderate pain, erythema and drainage of the breast.  She is taking antibiotics as prescribed.  I recommended right breast subareolar duct excision and drainage of abscess.  She understands that the wound may be packed open versus drain placement depending on how things look intraoperatively.  I have discussed with Dr. Giancarlo nichols who also evaluated the patient.  The risks and benefits of the procedure were reviewed with the patient including infection, bleeding, need for repeat procedure, injury to surrounding structures and poor cosmetic outcome.  Postoperative expectations and wound care were reviewed.  Questions were answered.  Refill for pain medication was prescribed until she can undergo surgery.  She was also counseled on smoking cessation.      HPI:  Joi Gerardo is a 43 y.o. female who is being seen today for right breast drainage. She had an I&D of the right breast performed by Dr. Medrano on 09/10/2024.  She states that the breast continues to become more swollen and the wound has opened up and draining purulent fluid.  Her  thinks has been approximately 25 cc of fluid.  She has difficulty sleeping secondary to pain.  No fevers or chills.    Allergies:  Allergies   Allergen Reactions   • Hydrocodone-Acetaminophen Rash and Shortness Of Breath       Medication Review:  Current Outpatient Medications on File Prior to Visit   Medication Sig Dispense Refill   • gabapentin (NEURONTIN) 300 MG capsule Take 1 capsule by mouth 3 times daily as needed (pain) for up to 30 doses. Max Daily Amount: 900 mg 30 capsule 0   • amoxicillin-clavulanate (AUGMENTIN)

## 2024-10-16 NOTE — PERIOP NOTE
Instructions for your surgery at       Today's Date:  10/16/2024      Patient's Name:  Joi Gerardo           Surgery Date:  10/17/2024              Please enter the main entrance of the hospital and check-in at the front security desk located in the lobby. They will direct you to the area to report for your surgery.     Do NOT eat or drink anything, including candy, gum, or ice chips after midnight prior to your surgery, unless you have specific instructions from your surgeon or anesthesia provider to do so.  Brush your teeth before coming to the hospital. You may swish with water, but do not swallow.  No smoking/Vaping/E-Cigarettes 24 hours prior to the day of surgery.  No alcohol 24 hours prior to the day of surgery.  No recreational drugs for one week prior to the day of surgery.  Bring Photo ID, Insurance information, and Co-pay if required on day of surgery.  Bring in pertinent legal documents, such as, Medical Power of , DNR, Advance Directive, etc.  Leave all valuables, including money/purse, at home.  Remove all jewelry, including ALL body piercings, nail polish, acrylic nails, and makeup (including mascara); no lotions, powders, deodorant, or perfume/cologne/after shave on the skin.  Follow instruction for Hibiclens washes and CHG wipes from surgeon's office.   Glasses and dentures may be worn to the hospital. They must be removed prior to surgery. Please bring case/container for glasses or dentures.   Contact lenses should not be worn on day of surgery.   Call your doctor's office if symptoms of a cold or illness develop within 24-48 hours prior to your surgery.  Call your doctor's office if you have any questions concerning insurance or co-pays.  15. AN ADULT (relative or friend 18 years or older) MUST DRIVE YOU HOME AFTER YOUR SURGERY.  16. Please make arrangements for a responsible adult (18 years or older) to be with you for 24 hours after your surgery.

## 2024-10-16 NOTE — TELEPHONE ENCOUNTER
Patient explaining that she went to  her pain medication that was sent in today but the one that was sent in she is allergic to. She needs alternative.

## 2024-10-17 ENCOUNTER — ANESTHESIA (OUTPATIENT)
Facility: HOSPITAL | Age: 44
End: 2024-10-17
Payer: MEDICAID

## 2024-10-17 ENCOUNTER — HOSPITAL ENCOUNTER (OUTPATIENT)
Facility: HOSPITAL | Age: 44
Setting detail: OUTPATIENT SURGERY
Discharge: HOME | End: 2024-10-17
Attending: SURGERY | Admitting: SURGERY
Payer: MEDICAID

## 2024-10-17 VITALS
BODY MASS INDEX: 36.94 KG/M2 | HEIGHT: 69 IN | WEIGHT: 249.4 LBS | OXYGEN SATURATION: 99 % | SYSTOLIC BLOOD PRESSURE: 138 MMHG | DIASTOLIC BLOOD PRESSURE: 72 MMHG | RESPIRATION RATE: 14 BRPM | HEART RATE: 64 BPM | TEMPERATURE: 98.4 F

## 2024-10-17 DIAGNOSIS — N61.1 ABSCESS OF BREAST: Primary | ICD-10-CM

## 2024-10-17 LAB
AMPHET UR QL SCN: NEGATIVE
BARBITURATES UR QL SCN: NEGATIVE
BENZODIAZ UR QL: NEGATIVE
CANNABINOIDS UR QL SCN: NEGATIVE
COCAINE UR QL SCN: NEGATIVE
Lab: ABNORMAL
METHADONE UR QL: POSITIVE
OPIATES UR QL: POSITIVE
PCP UR QL: NEGATIVE

## 2024-10-17 PROCEDURE — 3700000000 HC ANESTHESIA ATTENDED CARE: Performed by: SURGERY

## 2024-10-17 PROCEDURE — 2720000010 HC SURG SUPPLY STERILE: Performed by: SURGERY

## 2024-10-17 PROCEDURE — 87075 CULTR BACTERIA EXCEPT BLOOD: CPT

## 2024-10-17 PROCEDURE — 3700000001 HC ADD 15 MINUTES (ANESTHESIA): Performed by: SURGERY

## 2024-10-17 PROCEDURE — 6370000000 HC RX 637 (ALT 250 FOR IP): Performed by: ANESTHESIOLOGY

## 2024-10-17 PROCEDURE — 88304 TISSUE EXAM BY PATHOLOGIST: CPT

## 2024-10-17 PROCEDURE — 7100000010 HC PHASE II RECOVERY - FIRST 15 MIN: Performed by: SURGERY

## 2024-10-17 PROCEDURE — 2709999900 HC NON-CHARGEABLE SUPPLY: Performed by: SURGERY

## 2024-10-17 PROCEDURE — 7100000011 HC PHASE II RECOVERY - ADDTL 15 MIN: Performed by: SURGERY

## 2024-10-17 PROCEDURE — 6360000002 HC RX W HCPCS: Performed by: NURSE ANESTHETIST, CERTIFIED REGISTERED

## 2024-10-17 PROCEDURE — 2580000003 HC RX 258: Performed by: NURSE ANESTHETIST, CERTIFIED REGISTERED

## 2024-10-17 PROCEDURE — 19120 REMOVAL OF BREAST LESION: CPT | Performed by: SURGERY

## 2024-10-17 PROCEDURE — 94761 N-INVAS EAR/PLS OXIMETRY MLT: CPT

## 2024-10-17 PROCEDURE — 80307 DRUG TEST PRSMV CHEM ANLYZR: CPT

## 2024-10-17 PROCEDURE — 6370000000 HC RX 637 (ALT 250 FOR IP): Performed by: NURSE ANESTHETIST, CERTIFIED REGISTERED

## 2024-10-17 PROCEDURE — 6360000002 HC RX W HCPCS: Performed by: ANESTHESIOLOGY

## 2024-10-17 PROCEDURE — 3600000012 HC SURGERY LEVEL 2 ADDTL 15MIN: Performed by: SURGERY

## 2024-10-17 PROCEDURE — 2580000003 HC RX 258: Performed by: SURGERY

## 2024-10-17 PROCEDURE — 7100000000 HC PACU RECOVERY - FIRST 15 MIN: Performed by: SURGERY

## 2024-10-17 PROCEDURE — 3600000002 HC SURGERY LEVEL 2 BASE: Performed by: SURGERY

## 2024-10-17 PROCEDURE — 6360000002 HC RX W HCPCS: Performed by: SURGERY

## 2024-10-17 PROCEDURE — 7100000001 HC PACU RECOVERY - ADDTL 15 MIN: Performed by: SURGERY

## 2024-10-17 PROCEDURE — 87205 SMEAR GRAM STAIN: CPT

## 2024-10-17 PROCEDURE — 2700000000 HC OXYGEN THERAPY PER DAY

## 2024-10-17 PROCEDURE — 87070 CULTURE OTHR SPECIMN AEROBIC: CPT

## 2024-10-17 PROCEDURE — 2500000003 HC RX 250 WO HCPCS: Performed by: NURSE ANESTHETIST, CERTIFIED REGISTERED

## 2024-10-17 RX ORDER — DEXAMETHASONE SODIUM PHOSPHATE 4 MG/ML
INJECTION, SOLUTION INTRA-ARTICULAR; INTRALESIONAL; INTRAMUSCULAR; INTRAVENOUS; SOFT TISSUE
Status: DISCONTINUED | OUTPATIENT
Start: 2024-10-17 | End: 2024-10-17 | Stop reason: SDUPTHER

## 2024-10-17 RX ORDER — OXYCODONE AND ACETAMINOPHEN 5; 325 MG/1; MG/1
1 TABLET ORAL EVERY 6 HOURS PRN
Qty: 28 TABLET | Refills: 0 | Status: SHIPPED | OUTPATIENT
Start: 2024-10-17 | End: 2024-10-24

## 2024-10-17 RX ORDER — SODIUM CHLORIDE 0.9 % (FLUSH) 0.9 %
5-40 SYRINGE (ML) INJECTION PRN
Status: DISCONTINUED | OUTPATIENT
Start: 2024-10-17 | End: 2024-10-17 | Stop reason: HOSPADM

## 2024-10-17 RX ORDER — FAMOTIDINE 20 MG/1
20 TABLET, FILM COATED ORAL ONCE
Status: COMPLETED | OUTPATIENT
Start: 2024-10-17 | End: 2024-10-17

## 2024-10-17 RX ORDER — LIDOCAINE HYDROCHLORIDE 20 MG/ML
INJECTION, SOLUTION EPIDURAL; INFILTRATION; INTRACAUDAL; PERINEURAL
Status: DISCONTINUED | OUTPATIENT
Start: 2024-10-17 | End: 2024-10-17 | Stop reason: SDUPTHER

## 2024-10-17 RX ORDER — ONDANSETRON 2 MG/ML
INJECTION INTRAMUSCULAR; INTRAVENOUS
Status: DISCONTINUED | OUTPATIENT
Start: 2024-10-17 | End: 2024-10-17 | Stop reason: SDUPTHER

## 2024-10-17 RX ORDER — OXYCODONE AND ACETAMINOPHEN 5; 325 MG/1; MG/1
1 TABLET ORAL
Status: COMPLETED | OUTPATIENT
Start: 2024-10-17 | End: 2024-10-17

## 2024-10-17 RX ORDER — FENTANYL CITRATE 50 UG/ML
INJECTION, SOLUTION INTRAMUSCULAR; INTRAVENOUS
Status: DISCONTINUED | OUTPATIENT
Start: 2024-10-17 | End: 2024-10-17 | Stop reason: SDUPTHER

## 2024-10-17 RX ORDER — PROCHLORPERAZINE EDISYLATE 5 MG/ML
10 INJECTION INTRAMUSCULAR; INTRAVENOUS
Status: DISCONTINUED | OUTPATIENT
Start: 2024-10-17 | End: 2024-10-17 | Stop reason: HOSPADM

## 2024-10-17 RX ORDER — SODIUM CHLORIDE, SODIUM LACTATE, POTASSIUM CHLORIDE, CALCIUM CHLORIDE 600; 310; 30; 20 MG/100ML; MG/100ML; MG/100ML; MG/100ML
INJECTION, SOLUTION INTRAVENOUS CONTINUOUS
Status: DISCONTINUED | OUTPATIENT
Start: 2024-10-17 | End: 2024-10-17 | Stop reason: HOSPADM

## 2024-10-17 RX ORDER — NALOXONE HYDROCHLORIDE 0.4 MG/ML
INJECTION, SOLUTION INTRAMUSCULAR; INTRAVENOUS; SUBCUTANEOUS PRN
Status: DISCONTINUED | OUTPATIENT
Start: 2024-10-17 | End: 2024-10-17 | Stop reason: HOSPADM

## 2024-10-17 RX ORDER — HYDROMORPHONE HYDROCHLORIDE 2 MG/ML
0.5 INJECTION, SOLUTION INTRAMUSCULAR; INTRAVENOUS; SUBCUTANEOUS EVERY 4 HOURS PRN
Status: DISCONTINUED | OUTPATIENT
Start: 2024-10-17 | End: 2024-10-17

## 2024-10-17 RX ORDER — MIDAZOLAM HYDROCHLORIDE 1 MG/ML
INJECTION INTRAMUSCULAR; INTRAVENOUS
Status: DISCONTINUED | OUTPATIENT
Start: 2024-10-17 | End: 2024-10-17 | Stop reason: SDUPTHER

## 2024-10-17 RX ORDER — HYDROMORPHONE HYDROCHLORIDE 2 MG/ML
0.5 INJECTION, SOLUTION INTRAMUSCULAR; INTRAVENOUS; SUBCUTANEOUS EVERY 10 MIN PRN
Status: DISCONTINUED | OUTPATIENT
Start: 2024-10-17 | End: 2024-10-17 | Stop reason: HOSPADM

## 2024-10-17 RX ORDER — FENTANYL CITRATE 50 UG/ML
50 INJECTION, SOLUTION INTRAMUSCULAR; INTRAVENOUS EVERY 5 MIN PRN
Status: COMPLETED | OUTPATIENT
Start: 2024-10-17 | End: 2024-10-17

## 2024-10-17 RX ORDER — FENTANYL CITRATE 50 UG/ML
50 INJECTION, SOLUTION INTRAMUSCULAR; INTRAVENOUS EVERY 5 MIN PRN
Status: DISCONTINUED | OUTPATIENT
Start: 2024-10-17 | End: 2024-10-17 | Stop reason: HOSPADM

## 2024-10-17 RX ORDER — SCOLOPAMINE TRANSDERMAL SYSTEM 1 MG/1
1 PATCH, EXTENDED RELEASE TRANSDERMAL ONCE
Status: DISCONTINUED | OUTPATIENT
Start: 2024-10-17 | End: 2024-10-17 | Stop reason: HOSPADM

## 2024-10-17 RX ORDER — DEXMEDETOMIDINE HYDROCHLORIDE 100 UG/ML
INJECTION, SOLUTION INTRAVENOUS
Status: DISCONTINUED | OUTPATIENT
Start: 2024-10-17 | End: 2024-10-17 | Stop reason: SDUPTHER

## 2024-10-17 RX ORDER — SODIUM CHLORIDE 9 MG/ML
INJECTION, SOLUTION INTRAVENOUS PRN
Status: DISCONTINUED | OUTPATIENT
Start: 2024-10-17 | End: 2024-10-17 | Stop reason: HOSPADM

## 2024-10-17 RX ORDER — LIDOCAINE HYDROCHLORIDE 10 MG/ML
1 INJECTION, SOLUTION EPIDURAL; INFILTRATION; INTRACAUDAL; PERINEURAL
Status: DISCONTINUED | OUTPATIENT
Start: 2024-10-17 | End: 2024-10-17 | Stop reason: HOSPADM

## 2024-10-17 RX ORDER — ONDANSETRON 2 MG/ML
4 INJECTION INTRAMUSCULAR; INTRAVENOUS
Status: DISCONTINUED | OUTPATIENT
Start: 2024-10-17 | End: 2024-10-17 | Stop reason: HOSPADM

## 2024-10-17 RX ORDER — PROPOFOL 10 MG/ML
INJECTION, EMULSION INTRAVENOUS
Status: DISCONTINUED | OUTPATIENT
Start: 2024-10-17 | End: 2024-10-17 | Stop reason: SDUPTHER

## 2024-10-17 RX ORDER — SODIUM CHLORIDE 0.9 % (FLUSH) 0.9 %
5-40 SYRINGE (ML) INJECTION EVERY 12 HOURS SCHEDULED
Status: DISCONTINUED | OUTPATIENT
Start: 2024-10-17 | End: 2024-10-17 | Stop reason: HOSPADM

## 2024-10-17 RX ADMIN — LIDOCAINE HYDROCHLORIDE 60 MG: 20 INJECTION, SOLUTION EPIDURAL; INFILTRATION; INTRACAUDAL; PERINEURAL at 16:47

## 2024-10-17 RX ADMIN — FENTANYL CITRATE 100 MCG: 50 INJECTION INTRAMUSCULAR; INTRAVENOUS at 16:53

## 2024-10-17 RX ADMIN — FAMOTIDINE 20 MG: 20 TABLET ORAL at 14:50

## 2024-10-17 RX ADMIN — SODIUM CHLORIDE, POTASSIUM CHLORIDE, SODIUM LACTATE AND CALCIUM CHLORIDE: 600; 310; 30; 20 INJECTION, SOLUTION INTRAVENOUS at 14:49

## 2024-10-17 RX ADMIN — PROPOFOL 200 MG: 10 INJECTION, EMULSION INTRAVENOUS at 16:47

## 2024-10-17 RX ADMIN — HYDROMORPHONE HYDROCHLORIDE 0.5 MG: 2 INJECTION, SOLUTION INTRAMUSCULAR; INTRAVENOUS; SUBCUTANEOUS at 18:04

## 2024-10-17 RX ADMIN — OXYCODONE HYDROCHLORIDE AND ACETAMINOPHEN 1 TABLET: 5; 325 TABLET ORAL at 18:41

## 2024-10-17 RX ADMIN — FENTANYL CITRATE 50 MCG: 50 INJECTION INTRAMUSCULAR; INTRAVENOUS at 17:47

## 2024-10-17 RX ADMIN — ONDANSETRON 4 MG: 2 INJECTION INTRAMUSCULAR; INTRAVENOUS at 16:44

## 2024-10-17 RX ADMIN — DEXMEDETOMIDINE 10 MCG: 100 INJECTION, SOLUTION INTRAVENOUS at 16:41

## 2024-10-17 RX ADMIN — DEXAMETHASONE SODIUM PHOSPHATE 4 MG: 4 INJECTION INTRA-ARTICULAR; INTRALESIONAL; INTRAMUSCULAR; INTRAVENOUS; SOFT TISSUE at 16:46

## 2024-10-17 RX ADMIN — FENTANYL CITRATE 50 MCG: 50 INJECTION INTRAMUSCULAR; INTRAVENOUS at 17:37

## 2024-10-17 RX ADMIN — HYDROMORPHONE HYDROCHLORIDE 0.5 MG: 2 INJECTION, SOLUTION INTRAMUSCULAR; INTRAVENOUS; SUBCUTANEOUS at 18:15

## 2024-10-17 RX ADMIN — MIDAZOLAM 2 MG: 1 INJECTION, SOLUTION INTRAMUSCULAR; INTRAVENOUS at 16:43

## 2024-10-17 RX ADMIN — WATER 2000 MG: 1 INJECTION INTRAMUSCULAR; INTRAVENOUS; SUBCUTANEOUS at 16:49

## 2024-10-17 ASSESSMENT — PAIN DESCRIPTION - DESCRIPTORS
DESCRIPTORS: BURNING
DESCRIPTORS: SORE

## 2024-10-17 ASSESSMENT — PAIN DESCRIPTION - FREQUENCY
FREQUENCY: CONTINUOUS

## 2024-10-17 ASSESSMENT — PAIN DESCRIPTION - ORIENTATION
ORIENTATION: RIGHT

## 2024-10-17 ASSESSMENT — PAIN - FUNCTIONAL ASSESSMENT
PAIN_FUNCTIONAL_ASSESSMENT: ACTIVITIES ARE NOT PREVENTED
PAIN_FUNCTIONAL_ASSESSMENT: 0-10
PAIN_FUNCTIONAL_ASSESSMENT: ACTIVITIES ARE NOT PREVENTED
PAIN_FUNCTIONAL_ASSESSMENT: ACTIVITIES ARE NOT PREVENTED

## 2024-10-17 ASSESSMENT — PAIN DESCRIPTION - LOCATION
LOCATION: BREAST;INCISION
LOCATION: BREAST

## 2024-10-17 ASSESSMENT — PAIN SCALES - GENERAL
PAINLEVEL_OUTOF10: 8
PAINLEVEL_OUTOF10: 6
PAINLEVEL_OUTOF10: 3
PAINLEVEL_OUTOF10: 5
PAINLEVEL_OUTOF10: 8
PAINLEVEL_OUTOF10: 6

## 2024-10-17 ASSESSMENT — PAIN DESCRIPTION - PAIN TYPE
TYPE: SURGICAL PAIN

## 2024-10-17 NOTE — ANESTHESIA POSTPROCEDURE EVALUATION
Department of Anesthesiology  Postprocedure Note    Patient: Joi Gerardo  MRN: [id number]  YOB: 1980  Date of evaluation: 10/17/2024    Procedure Summary       Date: 10/17/24 Room / Location: [facility] 01 / [facility] MAIN OR    Anesthesia Start: 1643 Anesthesia Stop: 1710    Procedure: RIGHT BREAST SUBAREOLAR DUCT EXCISION, DRAINAGE OF ABSCESS (Right: Breast) Diagnosis:       Abscess of breast      (Abscess of breast [N61.1])    Surgeons: Shabana Medrano DO Responsible Provider: Garry Ware MD    Anesthesia Type: General ASA Status: 2            Anesthesia Type: General    Ilsa Phase I: Ilsa Score: 7    Ilsa Phase II:      Anesthesia Post Evaluation    Patient location during evaluation: bedside  Patient participation: complete - patient participated  Airway patency: patent  Cardiovascular status: hemodynamically stable  Respiratory status: acceptable  Hydration status: stable    No notable events documented.

## 2024-10-17 NOTE — PERIOP NOTE
Patient /Family /Designee has been informed that [facility] is not responsible for patient belongings per policy and the signed [facility] Patient Agreement document.  Personal items should be sent home or checked in with security.  Patient /Family /Designee selected the following action:                            []  Send personal items home with a family member or friend                                                 []  Check in personal items with security, excluding clothing                            [x]  Maintain personal items at the bedside, against recommendation                                 by Alfredo Henry [facility]                                   ** If patient /family /designee chooses to maintain personal items at the bedside,                                      Complete the patient belongings inventory in the EMR.

## 2024-10-17 NOTE — DISCHARGE INSTRUCTIONS
Post Operative Discharge Instructions    No driving for 24 hours after surgery and off of prescription pain medication.    Avoid activities that bump or cause jarring movements at the surgical site for 10 days.    No lifting more than 10-15 pounds for 4 weeks after surgery or until cleared for activity at your follow up.    Walking is encouraged after surgery.    Stairs are ok to climb.      DIET:    Diet as tolerated. Start with liquids then advance your diet based on how you fell.    No alcoholic beverages for 24 hours after surgery or while on antibiotics or pain mdications.    Drink plenty of water.        MEDICATIONS:    Use daily stool softners (over the counter such as Colace or Senekot) while on pain medications.     Resume pre-operative medications. If you are on any blood thinners see special instructions below.    Use prescriptions given or Tylenol, Ibuprofen as needed for pain.    Do not use more than 4000mg of Tylenol (acetaminophen) per day. Be aware this may be  in your prescription medication as well.    Be aware narcotic prescriptions are tightly controlled in the [facility]. If requiring more than one refill, a follow up appointment will be required.      WOUND CARE:      You have packing in the breast. This will be changed in the office on Monday. Call for follow up on Monday for nursing visit for dressing. Ok to change outer gauze as needed for any drainage daily until packing changed.     Do not tub bathe, swim, or soak incisions until cleared to do so at your follow up.    Ice bag to the affected area; 20 minutes on and 20 minutes off if desired.      FOLLOW UP CARE:    You should have an appointment scheduled within 14 days after surgery. If this is not yet scheduled, call the office.  Any forms that you need filled out regarding your medical care can be brought to the office at follow up appointment of faxed to: [phone]        CALL DOCTOR IF:  Temperature is over 101 degrees, a slight

## 2024-10-17 NOTE — ANESTHESIA PRE PROCEDURE
Department of Anesthesiology  Preprocedure Note       Name:  Joi Gerardo   Age:  43 y.o.  :  1980                                          MRN:  [id number]         Date:  10/17/2024      Surgeon: Surgeon(s):  Shabana Medrano DO    Procedure: Procedure(s):  RIGHT BREAST SUBAREOLAR DUCT EXCISION, DRAINAGE OF ABSCESS    Medications prior to admission:   Prior to Admission medications    Medication Sig Start Date End Date Taking? Authorizing Provider   amoxicillin-clavulanate (AUGMENTIN) 875-125 MG per tablet Take 1 tablet by mouth 2 times daily for 7 days 10/10/24 10/17/24 Yes Shabana Medrano DO   cloNIDine (CATAPRES) 0.2 MG tablet Take 1 tablet by mouth 2 times daily 24  Yes Lena Pavon DO   amLODIPine (NORVASC) 5 MG tablet take 1 tablet by mouth once daily 24  Yes Lena Pavon DO   omeprazole (PRILOSEC) 40 MG delayed release capsule Take 1 capsule by mouth every morning (before breakfast) Open capsule and take contents. Take daily for 60 days following surgery. 3/25/24  Yes Bakari Seo MD   HYDROcodone-acetaminophen (NORCO) 5-325 MG per tablet Take 1 tablet by mouth every 4 hours as needed for Pain for up to 3 days. Intended supply: 3 days. Take lowest dose possible to manage pain Max Daily Amount: 6 tablets  Patient not taking: Reported on 10/16/2024 10/16/24 10/19/24  YesShabana nash DO   gabapentin (NEURONTIN) 300 MG capsule Take 1 capsule by mouth 3 times daily as needed (pain) for up to 30 doses. Max Daily Amount: 900 mg 10/10/24 11/30/24  YesShabana nash DO   gabapentin (NEURONTIN) 300 MG capsule Take 1 capsule by mouth 3 times daily for 41 days. Max Daily Amount: 900 mg 10/9/24 11/19/24  YesgloryinShabana DO   ondansetron (ZOFRAN) 4 MG tablet Take 1 tablet by mouth daily as needed for Nausea or Vomiting 24   Lena Pavon DO   acetaminophen (TYLENOL) 500 MG tablet Take 1 tablet by mouth 4 times daily as needed for Pain 24   Gloria Carver PA-C

## 2024-10-17 NOTE — OP NOTE
Operative Note      Patient: Joi Gerardo  YOB: 1980  MRN: [id number]    Date of Procedure: 10/17/2024    Pre-Op Diagnosis Codes:      * Abscess of breast [N61.1]    Post-Op Diagnosis: Same       Procedure(s):  RIGHT BREAST SUBAREOLAR DUCT EXCISION, DRAINAGE OF ABSCESS    Surgeon(s):  Shabana Medrano DO    Assistant:   Surgical Assistant: Cecelia Medina; Sudha Chavira    Anesthesia: General    Estimated Blood Loss (mL): Minimal    Complications: None    Specimens:   ID Type Source Tests Collected by Time Destination   1 : Right Breast Abscess Swab Breast CULTURE, ANAEROBIC, CULTURE, WOUND Shabana Medrano DO 10/17/2024 1653        Implants:  * No implants in log *      Drains: * No LDAs found *    Findings:  Infection Present At Time Of Surgery (PATOS) (choose all levels that have infection present):  - Superficial Infection (skin/subcutaneous) present as evidenced by pus  Other Findings: see dictation     Detailed Description of Procedure:   After informed consent was obtained the patient was taken to the operating room and placed in the supine position.  General anesthesia was administered and titrated to effect.  The right breast was prepped and draped in the usual sterile fashion and a timeout procedure was performed.  Next using a 15 blade scalpel an areolar incision was made at the previous site.  Bovie electrocautery was used to dissect through subcutaneous tissue and white purulent fluid was expressed.  Wound cultures were obtained.  The majority of the previous cavity had sealed itself off.  The wound was irrigated with Irrisept solution.  Using an Allis clamp the tissue posterior to the nipple was circumferentially dissected using the Bovie electrocautery.  This tissue was sent as subareolar ducts.  The wound was hemostatic.  The breast was then packed open with 1 inch iodoform packing.  The patient was subsequently extubated, tolerated the procedure well and sent to

## 2024-10-17 NOTE — INTERVAL H&P NOTE
Update History & Physical    The patient's History and Physical of 10/17/2024 was reviewed with the patient and I examined the patient. There was no change. The surgical site was confirmed by the patient and me.     Plan: The risks, benefits, expected outcome, and alternative to the recommended procedure have been discussed with the patient. Patient understands and wants to proceed with the procedure.     Electronically signed by Shabana Medrano DO on 10/17/2024 at 3:04 PM

## 2024-10-20 LAB
BACTERIA SPEC CULT: NORMAL
BACTERIA SPEC CULT: NORMAL
GRAM STN SPEC: NORMAL
GRAM STN SPEC: NORMAL
SERVICE CMNT-IMP: NORMAL
SERVICE CMNT-IMP: NORMAL

## 2024-10-21 ENCOUNTER — OFFICE VISIT (OUTPATIENT)
Age: 44
End: 2024-10-21

## 2024-10-21 DIAGNOSIS — N61.1 ABSCESS OF BREAST: Primary | ICD-10-CM

## 2024-10-21 PROCEDURE — 99024 POSTOP FOLLOW-UP VISIT: CPT | Performed by: SURGERY

## 2024-10-21 NOTE — PROGRESS NOTES
Joi Gerardo is a 43 y.o. female (: 1980)     Chief Complaint   Patient presents with    Post-Op Check     Right breast abscess packing        Medication list and allergies have been reviewed with Marcelonancy YARA Akin and updated as of today's date.     I have gone over all Medical, Surgical and Social History with Joi Gerardo and updated/added the information accordingly.     1. Have you been to the ER, urgent care clinic since your last visit?  Hospitalized since your last visit?No    2. Have you seen or consulted any other health care providers outside of the [facility] since your last visit?  Include any pap smears or colon screening. No

## 2024-10-21 NOTE — PROGRESS NOTES
CC:   Chief Complaint   Patient presents with    Post-Op Check     Right breast abscess packing         Assessment:    ICD-10-CM    1. Abscess of breast  N61.1           Plan: The area was repacked with Aquacel.  Recommend packing changes 3 times a week.  Will consult home nursing for dressing changes and can perform them in our clinic until this is established.  She can apply clean gauze over the area daily and as needed for any drainage.  She agrees with this plan.        HPI:  Joi Gerardo is a 43 y.o. female who is here today for dressing change status post right breast subareolar duct excision and drainage of abscess.  She reports moderate pain.  No fevers or chills.    Allergies:  Allergies   Allergen Reactions    Hydrocodone-Acetaminophen Rash and Shortness Of Breath       Medication Review:  Current Outpatient Medications on File Prior to Visit   Medication Sig Dispense Refill    oxyCODONE-acetaminophen (PERCOCET) 5-325 MG per tablet Take 1 tablet by mouth every 6 hours as needed for Pain for up to 7 days. Intended supply: 7 days. Take lowest dose possible to manage pain Max Daily Amount: 4 tablets 28 tablet 0    amoxicillin-clavulanate (AUGMENTIN) 875-125 MG per tablet Take 1 tablet by mouth 2 times daily for 10 days 20 tablet 0    gabapentin (NEURONTIN) 300 MG capsule Take 1 capsule by mouth 3 times daily as needed (pain) for up to 30 doses. Max Daily Amount: 900 mg 30 capsule 0    gabapentin (NEURONTIN) 300 MG capsule Take 1 capsule by mouth 3 times daily for 41 days. Max Daily Amount: 900 mg 30 capsule 0    ondansetron (ZOFRAN) 4 MG tablet Take 1 tablet by mouth daily as needed for Nausea or Vomiting 30 tablet 0    cloNIDine (CATAPRES) 0.2 MG tablet Take 1 tablet by mouth 2 times daily 180 tablet 0    acetaminophen (TYLENOL) 500 MG tablet Take 1 tablet by mouth 4 times daily as needed for Pain 30 tablet 0    benazepril (LOTENSIN) 40 MG tablet take 1 tablet by mouth once daily 90 tablet 0

## 2024-10-25 ENCOUNTER — OFFICE VISIT (OUTPATIENT)
Age: 44
End: 2024-10-25

## 2024-10-25 VITALS
SYSTOLIC BLOOD PRESSURE: 136 MMHG | RESPIRATION RATE: 16 BRPM | OXYGEN SATURATION: 100 % | HEART RATE: 102 BPM | DIASTOLIC BLOOD PRESSURE: 88 MMHG | WEIGHT: 254.2 LBS | BODY MASS INDEX: 37.65 KG/M2 | HEIGHT: 69 IN | TEMPERATURE: 97.7 F

## 2024-10-25 DIAGNOSIS — Z72.0 TOBACCO ABUSE: ICD-10-CM

## 2024-10-25 DIAGNOSIS — N61.1 ABSCESS OF BREAST: Primary | ICD-10-CM

## 2024-10-25 PROCEDURE — 99024 POSTOP FOLLOW-UP VISIT: CPT

## 2024-10-25 ASSESSMENT — ENCOUNTER SYMPTOMS: SHORTNESS OF BREATH: 0

## 2024-10-25 NOTE — PROGRESS NOTES
Joi Gerardo is a 43 y.o. female (: 1980)     Chief Complaint   Patient presents with    Follow-up     Right breast abscess       Medication list and allergies have been reviewed with Joi Gerardo and updated as of today's date.     I have gone over all Medical, Surgical and Social History with Joi Gerardo and updated/added the information accordingly.     
Wound vac order faxed to [facility], with request machine and supplies be sent to pt home for home health to place.   
tablet take 1 tablet by mouth once daily 90 tablet 0    amLODIPine (NORVASC) 5 MG tablet take 1 tablet by mouth once daily 90 tablet 0    omeprazole (PRILOSEC) 40 MG delayed release capsule Take 1 capsule by mouth every morning (before breakfast) Open capsule and take contents. Take daily for 60 days following surgery. 60 capsule 0    ursodiol (ACTIGALL) 250 MG tablet Take 1 tablet by mouth in the morning and at bedtime Begin taking 2 weeks after surgery. Take twice daily for 6 months to prevent Gallstone formation. 180 tablet 1    ondansetron (ZOFRAN-ODT) 8 MG TBDP disintegrating tablet Place 1 tablet under the tongue every 8 hours as needed for Nausea or Vomiting 12 tablet 1    ergocalciferol (ERGOCALCIFEROL) 1.25 MG (80923 UT) capsule Take 1 capsule by mouth every 7 days 4 capsule 3     No current facility-administered medications on file prior to visit.       Systems Review:  Review of Systems   Constitutional:  Negative for chills and fever.   Respiratory:  Negative for shortness of breath.    Cardiovascular:  Negative for chest pain.       PMH:  Past Medical History:   Diagnosis Date    Arthritis     SPINE & KNEES    GERD (gastroesophageal reflux disease)     Heroin abuse (HCC)     HTN (hypertension)     Irritable bowel syndrome     Obesity     Currently seeking bariatric surgery    Osteoarthritis 2013    Lower lumbar spine       Surgical History:  Past Surgical History:   Procedure Laterality Date    BREAST SURGERY Right 9/10/2024    INCISION AND DRAINAGE RIGHT BREAST ABSCESS performed by Shabana Medrano DO at [facility] MAIN OR    BREAST SURGERY Right 10/17/2024    RIGHT BREAST SUBAREOLAR DUCT EXCISION, DRAINAGE OF ABSCESS performed by Shabana Medrano DO at [facility] MAIN OR     SECTION      COLONOSCOPY N/A 3/5/2020    DIAGNOSTIC COLONOSCOPY performed by Toney Brennan MD at [facility] ENDOSCOPY    ENDOMETRIAL ABLATION      ALEX-EN-Y GASTRIC BYPASS N/A 3/25/2024    LAPAROSCOPIC GASTRIC BYPASS;

## 2024-10-28 ENCOUNTER — OFFICE VISIT (OUTPATIENT)
Age: 44
End: 2024-10-28

## 2024-10-28 VITALS — BODY MASS INDEX: 37.54 KG/M2 | HEIGHT: 69 IN

## 2024-10-28 DIAGNOSIS — N61.1 ABSCESS OF BREAST: Primary | ICD-10-CM

## 2024-10-28 PROCEDURE — 99024 POSTOP FOLLOW-UP VISIT: CPT | Performed by: SURGERY

## 2024-10-28 RX ORDER — KETOROLAC TROMETHAMINE 10 MG/1
10 TABLET, FILM COATED ORAL 3 TIMES DAILY PRN
Qty: 20 TABLET | Refills: 0 | Status: SHIPPED | OUTPATIENT
Start: 2024-10-28 | End: 2025-10-28

## 2024-10-28 RX ORDER — LIDOCAINE 50 MG/G
1 PATCH TOPICAL DAILY
Qty: 30 PATCH | Refills: 0 | Status: SHIPPED | OUTPATIENT
Start: 2024-10-28 | End: 2024-11-27

## 2024-10-29 NOTE — PROGRESS NOTES
Joi Gerardo is a 43 y.o. female (: 1980)     Chief Complaint   Patient presents with    Follow-up     Right breast abscess        Medication list and allergies have been reviewed with Joi Gerardo and updated as of today's date.     I have gone over all Medical, Surgical and Social History with Joi Gerardo and updated/added the information accordingly.     1. Have you been to the ER, urgent care clinic since your last visit?  Hospitalized since your last visit?No    2. Have you seen or consulted any other health care providers outside of the [facility] since your last visit?  Include any pap smears or colon screening. No    
1993     Quit date: 2023     Years since quittin.2     Passive exposure: Never    Smokeless tobacco: Former   Vaping Use    Vaping status: Never Used   Substance and Sexual Activity    Alcohol use: Not Currently    Drug use: Not Currently     Frequency: 5.0 times per week     Types: Heroin, Marijuana (Weed)     Comment: none in 3 yrs    Sexual activity: Not Currently     Partners: Male     Social Determinants of Health     Financial Resource Strain: Low Risk  (2024)    Overall Financial Resource Strain (CARDIA)     Difficulty of Paying Living Expenses: Not hard at all   Food Insecurity: No Food Insecurity (2024)    Hunger Vital Sign     Worried About Running Out of Food in the Last Year: Never true     Ran Out of Food in the Last Year: Never true   Transportation Needs: No Transportation Needs (2024)    PRAPARE - Transportation     Lack of Transportation (Medical): No     Lack of Transportation (Non-Medical): No   Housing Stability: Unknown (2024)    Housing Stability Vital Sign     Unable to Pay for Housing in the Last Year: No     Homeless in the Last Year: No       Family History:  Family History   Problem Relation Age of Onset    Hypertension Mother     Alcohol Abuse Mother     High Blood Pressure Mother     Obesity Mother         Gastric bypassed 2006    Substance Abuse Mother     Hypertension Father     High Blood Pressure Father             Substance Abuse Father        Admission on 10/17/2024, Discharged on 10/17/2024   Component Date Value Ref Range Status    Benzodiazepines, Urine 10/17/2024 Negative  NEG   Final    Barbiturates, Urine 10/17/2024 Negative  NEG   Final    THC, TH-Cannabinol, Urine 10/17/2024 Negative  NEG   Final    Opiates, Urine 10/17/2024 Positive (A)  NEG   Final    Phencyclidine, Urine 10/17/2024 Negative  NEG   Final    Cocaine, Urine 10/17/2024 Negative  NEG   Final    Amphetamine, Urine 10/17/2024 Negative  NEG   Final    Methadone, Urine

## 2024-10-31 ENCOUNTER — TELEPHONE (OUTPATIENT)
Age: 44
End: 2024-10-31

## 2024-10-31 NOTE — TELEPHONE ENCOUNTER
Shelby Hamlin, care coordinator at [facility], calling to say that the [facility] due to needing more information from our office.   Phone number for Shelby [phone] ext [phone].

## 2024-11-01 DIAGNOSIS — I10 ESSENTIAL (PRIMARY) HYPERTENSION: ICD-10-CM

## 2024-11-01 RX ORDER — AMLODIPINE BESYLATE 5 MG/1
5 TABLET ORAL DAILY
Qty: 90 TABLET | Refills: 0 | Status: SHIPPED | OUTPATIENT
Start: 2024-11-01

## 2024-11-01 RX ORDER — BENAZEPRIL HYDROCHLORIDE 40 MG/1
40 TABLET ORAL DAILY
Qty: 90 TABLET | Refills: 0 | Status: SHIPPED | OUTPATIENT
Start: 2024-11-01

## 2024-11-04 ENCOUNTER — TELEMEDICINE (OUTPATIENT)
Facility: CLINIC | Age: 44
End: 2024-11-04
Payer: MEDICAID

## 2024-11-04 DIAGNOSIS — N61.1 ABSCESS OF BREAST: Primary | ICD-10-CM

## 2024-11-04 DIAGNOSIS — T14.8XXA SURGICAL WOUND PRESENT: ICD-10-CM

## 2024-11-04 PROCEDURE — 99214 OFFICE O/P EST MOD 30 MIN: CPT | Performed by: STUDENT IN AN ORGANIZED HEALTH CARE EDUCATION/TRAINING PROGRAM

## 2024-11-04 ASSESSMENT — PATIENT HEALTH QUESTIONNAIRE - PHQ9
SUM OF ALL RESPONSES TO PHQ QUESTIONS 1-9: 0
SUM OF ALL RESPONSES TO PHQ9 QUESTIONS 1 & 2: 0
2. FEELING DOWN, DEPRESSED OR HOPELESS: NOT AT ALL
1. LITTLE INTEREST OR PLEASURE IN DOING THINGS: NOT AT ALL
SUM OF ALL RESPONSES TO PHQ QUESTIONS 1-9: 0

## 2024-11-04 NOTE — PROGRESS NOTES
Joi Gerardo, was evaluated through a synchronous (real-time) audio-video encounter. The patient (or guardian if applicable) is aware that this is a billable service, which includes applicable co-pays. This Virtual Visit was conducted with patient's (and/or legal guardian's) consent. Patient identification was verified, and a caregiver was present when appropriate.   The patient was located at Home: [facility] [address]  Provider was located at Facility (Appt Dept): [address] [place] [id number]  Confirm you are appropriately licensed, registered, or certified to deliver care in the state where the patient is located as indicated above. If you are not or unsure, please re-schedule the visit: Yes, I confirm.     Joi Gerardo (:  1980) is a Established patient, presenting virtually for evaluation of the following:      Below is the assessment and plan developed based on review of pertinent history, physical exam, labs, studies, and medications.     Assessment & Plan  Abscess of breast   Chronic, not at goal (unstable), continue current treatment plan  -Placing urgent referral to wound clinic today as pt needs to be evaluated for wound vac. Advised pt to continue dressing changes as ordered per surgery  -Reviewed recent notes, labs and imaging from surgical providers    Orders:    External Referral To Wound Clinic    Surgical wound present       Orders:    External Referral To Wound Clinic      No follow-ups on file.       Subjective   S/p right breast subareolar duct excision and drainage of abscess which was performed by Dr. Medrano on 10/17/2024. She has been receiving dressing changes and packing Aquacel 3x/week however they have not been consistent. She was to receive a wound vac but has not been received. She has been managing her pain with Toradol and lidocaine patches. She denies any clinical signs of infection but notes that the wound

## 2024-11-04 NOTE — PROGRESS NOTES
\"Have you been to the ER, urgent care clinic since your last visit?  Hospitalized since your last visit?\"    NO    “Have you seen or consulted any other health care providers outside of [facility] since your last visit?”    NO        “Have you had a pap smear?”    NO    No cervical cancer screening on file

## 2024-11-04 NOTE — ASSESSMENT & PLAN NOTE
Chronic, not at goal (unstable), continue current treatment plan  -Placing urgent referral to wound clinic today as pt needs to be evaluated for wound vac. Advised pt to continue dressing changes as ordered per surgery  -Reviewed recent notes, labs and imaging from surgical providers    Orders:    External Referral To Wound Clinic

## 2024-11-05 ENCOUNTER — OFFICE VISIT (OUTPATIENT)
Age: 44
End: 2024-11-05

## 2024-11-05 VITALS
RESPIRATION RATE: 16 BRPM | HEIGHT: 69 IN | TEMPERATURE: 97.7 F | OXYGEN SATURATION: 99 % | SYSTOLIC BLOOD PRESSURE: 126 MMHG | WEIGHT: 253.2 LBS | HEART RATE: 93 BPM | BODY MASS INDEX: 37.5 KG/M2 | DIASTOLIC BLOOD PRESSURE: 84 MMHG

## 2024-11-05 DIAGNOSIS — N61.1 ABSCESS OF BREAST: Primary | ICD-10-CM

## 2024-11-05 PROCEDURE — 99024 POSTOP FOLLOW-UP VISIT: CPT

## 2024-11-05 ASSESSMENT — ENCOUNTER SYMPTOMS: SHORTNESS OF BREATH: 0

## 2024-11-05 NOTE — PROGRESS NOTES
I received a MyChart message from Ms. Gerardo requesting new orders for wound care.  Upon chart review, PCP has placed a referral to an external wound clinic yesterday 11/4/2024.

## 2024-11-05 NOTE — PROGRESS NOTES
Joi Gerardo is a 43 y.o. female (: 1980)     Chief Complaint   Patient presents with    Post-Op Check     Right breast abscess        Medication list and allergies have been reviewed with Joi YARA Akin and updated as of today's date.     I have gone over all Medical, Surgical and Social History with Joi Gerardo and updated/added the information accordingly.     1. Have you been to the ER, urgent care clinic since your last visit?  Hospitalized since your last visit?No    2. Have you seen or consulted any other health care providers outside of the [facility] since your last visit?  Include any pap smears or colon screening. No      
   Monocytes % 09/13/2024 6  3 - 10 % Final    Eosinophils % 09/13/2024 3  0 - 5 % Final    Basophils % 09/13/2024 0  0 - 2 % Final    Immature Granulocytes % 09/13/2024 0  0.0 - 0.5 % Final    Neutrophils Absolute 09/13/2024 5.5  1.8 - 8.0 K/UL Final    Lymphocytes Absolute 09/13/2024 3.4  0.9 - 3.6 K/UL Final    Monocytes Absolute 09/13/2024 0.6  0.05 - 1.2 K/UL Final    Eosinophils Absolute 09/13/2024 0.3  0.0 - 0.4 K/UL Final    Basophils Absolute 09/13/2024 0.0  0.0 - 0.1 K/UL Final    Immature Granulocytes Absolute 09/13/2024 0.0  0.00 - 0.04 K/UL Final    Differential Type 09/13/2024 AUTOMATED    Final    Sodium 09/13/2024 138  136 - 145 mmol/L Final    Potassium 09/13/2024 3.8  3.5 - 5.5 mmol/L Final    SLIGHTLY HEMOLYZED SPECIMEN    Chloride 09/13/2024 103  100 - 111 mmol/L Final    CO2 09/13/2024 28  21 - 32 mmol/L Final    Anion Gap 09/13/2024 7  3.0 - 18 mmol/L Final    Glucose 09/13/2024 110 (H)  74 - 99 mg/dL Final    BUN 09/13/2024 7  7.0 - 18 MG/DL Final    Creatinine 09/13/2024 0.70  0.6 - 1.3 MG/DL Final    BUN/Creatinine Ratio 09/13/2024 10 (L)  12 - 20   Final    Est, Glom Filt Rate 09/13/2024 >90  >60 ml/min/1.73m2 Final    Comment:    Pediatric calculator link: https://www.kidney.org/professionals/kdoqi/gfr_calculatorped     These results are not intended for use in patients <18 years of age.     eGFR results are calculated without a race factor using  the 2021 CKD-EPI equation. Careful clinical correlation is recommended, particularly when comparing to results calculated using previous equations.  The CKD-EPI equation is less accurate in patients with extremes of muscle mass, extra-renal metabolism of creatinine, excessive creatine ingestion, or following therapy that affects renal tubular secretion.      Calcium 09/13/2024 9.1  8.5 - 10.1 MG/DL Final    Total Bilirubin 09/13/2024 0.3  0.2 - 1.0 MG/DL Final    ALT 09/13/2024 16  13 - 56 U/L Final    AST 09/13/2024 16  10 - 38 U/L Final

## 2024-11-07 DIAGNOSIS — N61.1 ABSCESS OF BREAST: Primary | ICD-10-CM

## 2024-11-08 ENCOUNTER — TELEPHONE (OUTPATIENT)
Age: 44
End: 2024-11-08

## 2024-11-08 ENCOUNTER — TELEPHONE (OUTPATIENT)
Facility: CLINIC | Age: 44
End: 2024-11-08

## 2024-11-08 NOTE — TELEPHONE ENCOUNTER
Called [facility] regarding wound vac orders. Per note from our office patient's PCP is ordering wound clinic/wound care for patient per patients request. Referral order in chart from PCP to wound clinic. [facility] asked me to contact patient and let her know this. Called patient but voicemail was full and I was unable to leave message.

## 2024-11-08 NOTE — TELEPHONE ENCOUNTER
Shelby Fuentes [facility] is calling needing orders for patient to have wound vac placed faxed to [phone]

## 2024-11-11 ENCOUNTER — TELEPHONE (OUTPATIENT)
Age: 44
End: 2024-11-11

## 2024-11-11 NOTE — TELEPHONE ENCOUNTER
[facility] called and states the form that was faxed back was incomplete. Please sign and date beside begin service date and sign and please fax back

## 2024-11-12 NOTE — PROGRESS NOTES
CC:   Chief Complaint   Patient presents with    Follow-up     Abscess breast right        Assessment:    ICD-10-CM    1. Abscess of breast  N61.1 External Referral To Home Health          Plan: I have cleaned the wound and repacked it with Aquacel.  She states that she established with a new home health service and saw them today.  I will place orders for wound vac placement to be done by home health.  She may follow-up with me or Dr. Medrano for any questions or new concerns.    HPI:  Joi Gerardo is a 43 y.o. female who is here today for a packing change.  She denies fevers or chills.    Allergies:  Allergies   Allergen Reactions    Hydrocodone-Acetaminophen Rash and Shortness Of Breath       Medication Review:  Current Outpatient Medications on File Prior to Visit   Medication Sig Dispense Refill    amLODIPine (NORVASC) 5 MG tablet take 1 tablet by mouth once daily 90 tablet 0    benazepril (LOTENSIN) 40 MG tablet take 1 tablet by mouth once daily 90 tablet 0    gabapentin (NEURONTIN) 300 MG capsule Take 1 capsule by mouth 3 times daily as needed (pain) for up to 30 doses. Max Daily Amount: 900 mg 30 capsule 0    ondansetron (ZOFRAN) 4 MG tablet Take 1 tablet by mouth daily as needed for Nausea or Vomiting 30 tablet 0    cloNIDine (CATAPRES) 0.2 MG tablet Take 1 tablet by mouth 2 times daily 180 tablet 0    acetaminophen (TYLENOL) 500 MG tablet Take 1 tablet by mouth 4 times daily as needed for Pain 30 tablet 0    omeprazole (PRILOSEC) 40 MG delayed release capsule Take 1 capsule by mouth every morning (before breakfast) Open capsule and take contents. Take daily for 60 days following surgery. 60 capsule 0    ergocalciferol (ERGOCALCIFEROL) 1.25 MG (98002 UT) capsule Take 1 capsule by mouth every 7 days 4 capsule 3    lidocaine (LIDODERM) 5 % Place 1 patch onto the skin daily 12 hours on, 12 hours off. (Patient not taking: Reported on 11/13/2024) 30 patch 0    ketorolac (TORADOL) 10 MG tablet Take 1

## 2024-11-12 NOTE — TELEPHONE ENCOUNTER
They need to contact the surgeon I do no place nor order wound vac. I placed an order for the pt to be seen at [facility] wound [facility]

## 2024-11-13 ENCOUNTER — OFFICE VISIT (OUTPATIENT)
Age: 44
End: 2024-11-13

## 2024-11-13 VITALS
OXYGEN SATURATION: 97 % | WEIGHT: 252 LBS | TEMPERATURE: 97.4 F | HEIGHT: 67 IN | DIASTOLIC BLOOD PRESSURE: 84 MMHG | BODY MASS INDEX: 39.55 KG/M2 | SYSTOLIC BLOOD PRESSURE: 130 MMHG | HEART RATE: 70 BPM | RESPIRATION RATE: 18 BRPM

## 2024-11-13 DIAGNOSIS — N61.1 ABSCESS OF BREAST: Primary | ICD-10-CM

## 2024-11-13 PROCEDURE — 99024 POSTOP FOLLOW-UP VISIT: CPT

## 2024-11-13 ASSESSMENT — ENCOUNTER SYMPTOMS: SHORTNESS OF BREATH: 0

## 2024-11-13 NOTE — PROGRESS NOTES
Chief Complaint   Patient presents with    Follow-up     Abscess breast right    1. Have you been to the ER, urgent care clinic since your last visit?  Hospitalized since your last visit?No    2. Have you seen or consulted any other health care providers outside of the [facility] since your last visit?  Include any pap smears or colon screening. No    [Fever] : no fever [Chills] : no chills [As Noted in HPI] : as noted in HPI [Negative] : Psychiatric

## 2024-11-21 ENCOUNTER — TELEPHONE (OUTPATIENT)
Age: 44
End: 2024-11-21

## 2024-11-21 ENCOUNTER — PATIENT MESSAGE (OUTPATIENT)
Age: 44
End: 2024-11-21

## 2024-11-21 DIAGNOSIS — N61.1 ABSCESS OF BREAST: ICD-10-CM

## 2024-11-21 RX ORDER — KETOROLAC TROMETHAMINE 10 MG/1
10 TABLET, FILM COATED ORAL 3 TIMES DAILY PRN
Qty: 20 TABLET | Refills: 0 | OUTPATIENT
Start: 2024-11-21 | End: 2025-11-21

## 2024-11-21 RX ORDER — GABAPENTIN 300 MG/1
300 CAPSULE ORAL 3 TIMES DAILY PRN
Qty: 30 CAPSULE | Refills: 0 | OUTPATIENT
Start: 2024-11-21 | End: 2025-01-11

## 2024-11-21 NOTE — TELEPHONE ENCOUNTER
Patient calling requesting to speak with Ingrid. She is asking that a refill of Gabapentin be sent in to her [facility] pharmacy. Call back number [phone].

## 2024-11-22 DIAGNOSIS — N61.1 ABSCESS OF BREAST: ICD-10-CM

## 2024-11-22 RX ORDER — KETOROLAC TROMETHAMINE 10 MG/1
10 TABLET, FILM COATED ORAL 3 TIMES DAILY PRN
Qty: 20 TABLET | Refills: 0 | Status: SHIPPED | OUTPATIENT
Start: 2024-11-22 | End: 2025-11-22

## 2024-11-22 RX ORDER — GABAPENTIN 300 MG/1
300 CAPSULE ORAL 3 TIMES DAILY PRN
Qty: 30 CAPSULE | Refills: 0 | Status: SHIPPED | OUTPATIENT
Start: 2024-11-22 | End: 2025-01-12

## 2024-11-26 DIAGNOSIS — I10 ESSENTIAL (PRIMARY) HYPERTENSION: ICD-10-CM

## 2024-11-26 RX ORDER — CLONIDINE HYDROCHLORIDE 0.2 MG/1
0.2 TABLET ORAL 2 TIMES DAILY
Qty: 180 TABLET | Refills: 0 | Status: SHIPPED | OUTPATIENT
Start: 2024-11-26

## 2024-11-26 NOTE — TELEPHONE ENCOUNTER
Last Appointment:  11/4/2024  Future Appointments   Date Time Provider Department Center   8/19/2025  3:15 PM SANJANA ANDERSEN [facility]

## 2024-11-29 RX ORDER — SULFAMETHOXAZOLE AND TRIMETHOPRIM 800; 160 MG/1; MG/1
1 TABLET ORAL 2 TIMES DAILY
Qty: 20 TABLET | Refills: 0 | Status: SHIPPED | OUTPATIENT
Start: 2024-11-29 | End: 2024-12-09

## 2024-11-29 NOTE — TELEPHONE ENCOUNTER
Bactrim DS called in. Patient feels she is developing infection and can't be seen today in the office. Follow up next week or ER if symptoms progress prior to then    Shabana Medrano, DO

## 2024-12-04 ENCOUNTER — OFFICE VISIT (OUTPATIENT)
Age: 44
End: 2024-12-04

## 2024-12-04 VITALS
TEMPERATURE: 97.1 F | HEART RATE: 82 BPM | SYSTOLIC BLOOD PRESSURE: 122 MMHG | WEIGHT: 257 LBS | HEIGHT: 67 IN | OXYGEN SATURATION: 99 % | DIASTOLIC BLOOD PRESSURE: 66 MMHG | RESPIRATION RATE: 18 BRPM | BODY MASS INDEX: 40.34 KG/M2

## 2024-12-04 DIAGNOSIS — N61.1 ABSCESS OF BREAST: Primary | ICD-10-CM

## 2024-12-04 DIAGNOSIS — I10 ESSENTIAL (PRIMARY) HYPERTENSION: ICD-10-CM

## 2024-12-04 DIAGNOSIS — N61.1 ABSCESS OF BREAST: ICD-10-CM

## 2024-12-04 PROCEDURE — 99024 POSTOP FOLLOW-UP VISIT: CPT

## 2024-12-04 RX ORDER — AMLODIPINE BESYLATE 5 MG/1
5 TABLET ORAL DAILY
Qty: 90 TABLET | Refills: 0 | Status: CANCELLED | OUTPATIENT
Start: 2024-12-04

## 2024-12-04 ASSESSMENT — ENCOUNTER SYMPTOMS
VOMITING: 0
SHORTNESS OF BREATH: 0
NAUSEA: 0

## 2024-12-04 NOTE — PROGRESS NOTES
CC:   Chief Complaint   Patient presents with    Follow-up     History right breast abscess wound check        Assessment:    ICD-10-CM    1. Abscess of breast  N61.1           Plan: I have cleaned the wound and placed a small piece of Aquacel over the wound.  She is being discharged from home health services this week for completion of care.  She may follow-up with me or Dr. Medrano as needed for questions or concerns.      HPI:  Joi Gerardo is a 43 y.o. female who is here to follow-up for wound care on her right breast.  She had right breast subareolar duct excision and drainage of abscess performed by Dr. Medrano on 10/18/2024.  She had concerns for developing infection and was given a prescription for a 10-day course of Bactrim DS on 11/21/2024.  She denies fevers or chills.    Allergies:  Allergies   Allergen Reactions    Hydrocodone-Acetaminophen Rash and Shortness Of Breath       Medication Review:  Current Outpatient Medications on File Prior to Visit   Medication Sig Dispense Refill    sulfamethoxazole-trimethoprim (BACTRIM DS;SEPTRA DS) 800-160 MG per tablet Take 1 tablet by mouth 2 times daily for 10 days 20 tablet 0    cloNIDine (CATAPRES) 0.2 MG tablet take 1 tablet by mouth twice a day 180 tablet 0    gabapentin (NEURONTIN) 300 MG capsule Take 1 capsule by mouth 3 times daily as needed (pain) for up to 30 doses. Max Daily Amount: 900 mg 30 capsule 0    ketorolac (TORADOL) 10 MG tablet Take 1 tablet by mouth 3 times daily as needed for Pain 20 tablet 0    amLODIPine (NORVASC) 5 MG tablet take 1 tablet by mouth once daily 90 tablet 0    benazepril (LOTENSIN) 40 MG tablet take 1 tablet by mouth once daily 90 tablet 0    ondansetron (ZOFRAN) 4 MG tablet Take 1 tablet by mouth daily as needed for Nausea or Vomiting 30 tablet 0    acetaminophen (TYLENOL) 500 MG tablet Take 1 tablet by mouth 4 times daily as needed for Pain 30 tablet 0    omeprazole (PRILOSEC) 40 MG delayed release capsule Take 1

## 2024-12-04 NOTE — PROGRESS NOTES
Chief Complaint   Patient presents with    Follow-up     History right breast abscess wound check    1. Have you been to the ER, urgent care clinic since your last visit?  Hospitalized since your last visit?No    2. Have you seen or consulted any other health care providers outside of the [facility] since your last visit?  Include any pap smears or colon screening. No

## 2024-12-05 DIAGNOSIS — N61.1 ABSCESS OF BREAST: ICD-10-CM

## 2024-12-05 RX ORDER — GABAPENTIN 300 MG/1
300 CAPSULE ORAL 3 TIMES DAILY PRN
Qty: 30 CAPSULE | Refills: 0 | Status: SHIPPED | OUTPATIENT
Start: 2024-12-05 | End: 2025-01-25

## 2024-12-05 RX ORDER — ERGOCALCIFEROL 1.25 MG/1
50000 CAPSULE ORAL
Qty: 12 CAPSULE | Refills: 1 | Status: SHIPPED | OUTPATIENT
Start: 2024-12-05

## 2024-12-06 RX ORDER — GABAPENTIN 300 MG/1
300 CAPSULE ORAL 3 TIMES DAILY PRN
Qty: 30 CAPSULE | Refills: 0 | OUTPATIENT
Start: 2024-12-06 | End: 2025-01-26

## 2025-01-06 ENCOUNTER — PATIENT MESSAGE (OUTPATIENT)
Facility: CLINIC | Age: 45
End: 2025-01-06

## 2025-01-06 DIAGNOSIS — I10 ESSENTIAL (PRIMARY) HYPERTENSION: ICD-10-CM

## 2025-01-06 NOTE — TELEPHONE ENCOUNTER
Pt is requesting   Requested Prescriptions     Pending Prescriptions Disp Refills    amLODIPine (NORVASC) 5 MG tablet 90 tablet 0     Sig: Take 1 tablet by mouth daily     HCTZ - WAS DISCONTINUED ON 03/25/24  Gabapentin - last prescribed by [facility].    PCP notified.

## 2025-01-08 RX ORDER — AMLODIPINE BESYLATE 5 MG/1
5 TABLET ORAL DAILY
Qty: 90 TABLET | Refills: 0 | Status: SHIPPED | OUTPATIENT
Start: 2025-01-08

## 2025-01-14 ENCOUNTER — OFFICE VISIT (OUTPATIENT)
Age: 45
End: 2025-01-14

## 2025-01-14 VITALS
HEART RATE: 122 BPM | SYSTOLIC BLOOD PRESSURE: 138 MMHG | DIASTOLIC BLOOD PRESSURE: 86 MMHG | WEIGHT: 244 LBS | OXYGEN SATURATION: 98 % | TEMPERATURE: 96.9 F | BODY MASS INDEX: 38.3 KG/M2 | RESPIRATION RATE: 20 BRPM | HEIGHT: 67 IN

## 2025-01-14 DIAGNOSIS — N61.1 ABSCESS OF BREAST: Primary | ICD-10-CM

## 2025-01-14 DIAGNOSIS — N60.41 PERIDUCTAL MASTITIS OF RIGHT BREAST: ICD-10-CM

## 2025-01-14 DIAGNOSIS — Z72.0 TOBACCO ABUSE: ICD-10-CM

## 2025-01-14 RX ORDER — SULFAMETHOXAZOLE AND TRIMETHOPRIM 800; 160 MG/1; MG/1
1 TABLET ORAL 2 TIMES DAILY
Qty: 14 TABLET | Refills: 0 | Status: SHIPPED | OUTPATIENT
Start: 2025-01-14 | End: 2025-01-21

## 2025-01-14 ASSESSMENT — ENCOUNTER SYMPTOMS
NAUSEA: 0
SHORTNESS OF BREATH: 0
VOMITING: 0

## 2025-01-14 NOTE — PROGRESS NOTES
Joi Gerardo is a 44 y.o. female (: 1980)     Chief Complaint   Patient presents with    Follow-up     Right breast        Medication list and allergies have been reviewed with Joi Gerardo and updated as of today's date.     I have gone over all Medical, Surgical and Social History with Joi LIVINGSTON [place] and updated/added the information accordingly.     1. Have you been to the ER, urgent care clinic since your last visit?  Hospitalized since your last visit?No    2. Have you seen or consulted any other health care providers outside of the [facility] since your last visit?  Include any pap smears or colon screening. No    
status: Never Used   Substance and Sexual Activity    Alcohol use: Not Currently    Drug use: Not Currently     Frequency: 5.0 times per week     Types: Heroin, Marijuana (Weed)     Comment: none in 3 yrs    Sexual activity: Not Currently     Partners: Male     Social Determinants of Health     Financial Resource Strain: Low Risk  (2024)    Overall Financial Resource Strain (CARDIA)     Difficulty of Paying Living Expenses: Not hard at all   Food Insecurity: No Food Insecurity (2024)    Hunger Vital Sign     Worried About Running Out of Food in the Last Year: Never true     Ran Out of Food in the Last Year: Never true   Transportation Needs: No Transportation Needs (2024)    PRAPARE - Transportation     Lack of Transportation (Medical): No     Lack of Transportation (Non-Medical): No   Housing Stability: Unknown (2024)    Housing Stability Vital Sign     Unable to Pay for Housing in the Last Year: No     Homeless in the Last Year: No       Family History:  Family History   Problem Relation Age of Onset    Hypertension Mother     Alcohol Abuse Mother     High Blood Pressure Mother     Obesity Mother         Gastric bypassed     Substance Abuse Mother     Hypertension Father     High Blood Pressure Father             Substance Abuse Father        Admission on 10/17/2024, Discharged on 10/17/2024   Component Date Value Ref Range Status    Benzodiazepines, Urine 10/17/2024 Negative  NEG   Final    Barbiturates, Urine 10/17/2024 Negative  NEG   Final    THC, TH-Cannabinol, Urine 10/17/2024 Negative  NEG   Final    Opiates, Urine 10/17/2024 Positive (A)  NEG   Final    Phencyclidine, Urine 10/17/2024 Negative  NEG   Final    Cocaine, Urine 10/17/2024 Negative  NEG   Final    Amphetamine, Urine 10/17/2024 Negative  NEG   Final    Methadone, Urine 10/17/2024 Positive (A)  NEG   Final    Comments: 10/17/2024 (NOTE)   Final    Comment: Specimen analysis was performed without chain of custody

## 2025-01-16 DIAGNOSIS — N61.1 ABSCESS OF BREAST: ICD-10-CM

## 2025-01-16 RX ORDER — GABAPENTIN 300 MG/1
300 CAPSULE ORAL 3 TIMES DAILY PRN
Qty: 30 CAPSULE | Refills: 0 | Status: CANCELLED | OUTPATIENT
Start: 2025-01-16 | End: 2025-03-08

## 2025-01-16 RX ORDER — GABAPENTIN 300 MG/1
300 CAPSULE ORAL 3 TIMES DAILY PRN
Qty: 30 CAPSULE | Refills: 0 | Status: SHIPPED | OUTPATIENT
Start: 2025-01-16 | End: 2025-03-08

## 2025-01-20 SDOH — ECONOMIC STABILITY: TRANSPORTATION INSECURITY
IN THE PAST 12 MONTHS, HAS THE LACK OF TRANSPORTATION KEPT YOU FROM MEDICAL APPOINTMENTS OR FROM GETTING MEDICATIONS?: NO

## 2025-01-20 SDOH — ECONOMIC STABILITY: FOOD INSECURITY: WITHIN THE PAST 12 MONTHS, YOU WORRIED THAT YOUR FOOD WOULD RUN OUT BEFORE YOU GOT MONEY TO BUY MORE.: NEVER TRUE

## 2025-01-20 SDOH — ECONOMIC STABILITY: FOOD INSECURITY: WITHIN THE PAST 12 MONTHS, THE FOOD YOU BOUGHT JUST DIDN'T LAST AND YOU DIDN'T HAVE MONEY TO GET MORE.: NEVER TRUE

## 2025-01-20 SDOH — ECONOMIC STABILITY: INCOME INSECURITY: IN THE LAST 12 MONTHS, WAS THERE A TIME WHEN YOU WERE NOT ABLE TO PAY THE MORTGAGE OR RENT ON TIME?: YES

## 2025-01-20 SDOH — ECONOMIC STABILITY: TRANSPORTATION INSECURITY
IN THE PAST 12 MONTHS, HAS LACK OF TRANSPORTATION KEPT YOU FROM MEETINGS, WORK, OR FROM GETTING THINGS NEEDED FOR DAILY LIVING?: NO

## 2025-01-23 ENCOUNTER — OFFICE VISIT (OUTPATIENT)
Facility: CLINIC | Age: 45
End: 2025-01-23
Payer: MEDICAID

## 2025-01-23 VITALS
RESPIRATION RATE: 16 BRPM | HEART RATE: 87 BPM | SYSTOLIC BLOOD PRESSURE: 146 MMHG | WEIGHT: 252.6 LBS | TEMPERATURE: 97.6 F | HEIGHT: 67 IN | OXYGEN SATURATION: 100 % | BODY MASS INDEX: 39.65 KG/M2 | DIASTOLIC BLOOD PRESSURE: 97 MMHG

## 2025-01-23 DIAGNOSIS — F43.21 GRIEF: ICD-10-CM

## 2025-01-23 DIAGNOSIS — I10 ESSENTIAL (PRIMARY) HYPERTENSION: ICD-10-CM

## 2025-01-23 DIAGNOSIS — N61.1 ABSCESS OF BREAST: ICD-10-CM

## 2025-01-23 DIAGNOSIS — F41.9 ANXIETY: Primary | ICD-10-CM

## 2025-01-23 PROCEDURE — 99213 OFFICE O/P EST LOW 20 MIN: CPT | Performed by: STUDENT IN AN ORGANIZED HEALTH CARE EDUCATION/TRAINING PROGRAM

## 2025-01-23 PROCEDURE — 3080F DIAST BP >= 90 MM HG: CPT | Performed by: STUDENT IN AN ORGANIZED HEALTH CARE EDUCATION/TRAINING PROGRAM

## 2025-01-23 PROCEDURE — 3077F SYST BP >= 140 MM HG: CPT | Performed by: STUDENT IN AN ORGANIZED HEALTH CARE EDUCATION/TRAINING PROGRAM

## 2025-01-23 RX ORDER — AMLODIPINE BESYLATE 5 MG/1
5 TABLET ORAL DAILY
Qty: 90 TABLET | Refills: 0 | Status: SHIPPED | OUTPATIENT
Start: 2025-01-23

## 2025-01-23 RX ORDER — ESCITALOPRAM OXALATE 10 MG/1
10 TABLET ORAL DAILY
Qty: 30 TABLET | Refills: 2 | Status: SHIPPED | OUTPATIENT
Start: 2025-01-23

## 2025-01-23 RX ORDER — LORAZEPAM 0.5 MG/1
0.5 TABLET ORAL EVERY 8 HOURS PRN
Qty: 90 TABLET | Refills: 0 | Status: SHIPPED | OUTPATIENT
Start: 2025-01-23 | End: 2025-02-22

## 2025-01-23 ASSESSMENT — PATIENT HEALTH QUESTIONNAIRE - PHQ9
SUM OF ALL RESPONSES TO PHQ QUESTIONS 1-9: 0
2. FEELING DOWN, DEPRESSED OR HOPELESS: NOT AT ALL
SUM OF ALL RESPONSES TO PHQ QUESTIONS 1-9: 0
1. LITTLE INTEREST OR PLEASURE IN DOING THINGS: NOT AT ALL
SUM OF ALL RESPONSES TO PHQ QUESTIONS 1-9: 0
SUM OF ALL RESPONSES TO PHQ QUESTIONS 1-9: 0
SUM OF ALL RESPONSES TO PHQ9 QUESTIONS 1 & 2: 0

## 2025-01-23 ASSESSMENT — ANXIETY QUESTIONNAIRES
4. TROUBLE RELAXING: MORE THAN HALF THE DAYS
1. FEELING NERVOUS, ANXIOUS, OR ON EDGE: SEVERAL DAYS
5. BEING SO RESTLESS THAT IT IS HARD TO SIT STILL: SEVERAL DAYS
GAD7 TOTAL SCORE: 9
3. WORRYING TOO MUCH ABOUT DIFFERENT THINGS: MORE THAN HALF THE DAYS
7. FEELING AFRAID AS IF SOMETHING AWFUL MIGHT HAPPEN: NOT AT ALL
6. BECOMING EASILY ANNOYED OR IRRITABLE: NOT AT ALL
2. NOT BEING ABLE TO STOP OR CONTROL WORRYING: NEARLY EVERY DAY
IF YOU CHECKED OFF ANY PROBLEMS ON THIS QUESTIONNAIRE, HOW DIFFICULT HAVE THESE PROBLEMS MADE IT FOR YOU TO DO YOUR WORK, TAKE CARE OF THINGS AT HOME, OR GET ALONG WITH OTHER PEOPLE: SOMEWHAT DIFFICULT

## 2025-01-23 NOTE — PROGRESS NOTES
\"Have you been to the ER, urgent care clinic since your last visit?  Hospitalized since your last visit?\"    NO    “Have you seen or consulted any other health care providers outside of [facility] since your last visit?”    YES - When: approximately 1  weeks ago.  Where and Why: Surgeon .     “Have you had a pap smear?”    NO    No cervical cancer screening on file             Click Here for Release of Records Request   
Essential (primary) hypertension  I10 amLODIPine (NORVASC) 5 MG tablet      3. Grief  F43.21 External Referral To Psychology      4. Abscess of breast  N61.1       Anxiety-New onset  -Given the overwhelming feelings of anxiety coupled with green the recent declining health of her  we have discussed several options to help with her anxiety during this time.  Starting her on Lexapro 10mg daily and Ativan 0.5mg PRN.  Discussed potential side effects of medication in detail.    HTN-Chronic  -Not at goal..   -Continue current dose of HCTZ and amlodipine  -Stressed the importance of taking BP medication daily and not allowing to run out of medication.   -Encouraged to check BP at home once medication is restarted to ensure the BP is coming down.   -Advised salt restriction and getting 150min of weekly exercise.    Breast abscess  -Encouraged to continue antibiotics as prescribed  -Defer further management to surgical team  -Reviewed recent notes, labs and imaging       Return in about 4 weeks (around 2/20/2025) for Anxiety.

## 2025-01-28 DIAGNOSIS — I10 ESSENTIAL (PRIMARY) HYPERTENSION: ICD-10-CM

## 2025-01-28 RX ORDER — BENAZEPRIL HYDROCHLORIDE 40 MG/1
40 TABLET ORAL DAILY
Qty: 90 TABLET | Refills: 0 | Status: SHIPPED | OUTPATIENT
Start: 2025-01-28

## 2025-01-28 NOTE — TELEPHONE ENCOUNTER
Last Appointment:  1/23/2025  Future Appointments   Date Time Provider Department Center   2/20/2025 12:45 PM Lena Pavon DO [facility]   8/19/2025  3:15 PM SANJANA ANDERSEN RM 1 [facility]

## 2025-02-25 NOTE — PROGRESS NOTES
Joi Gerardo is a 44 y.o. female (: 1980)     Chief Complaint   Patient presents with    Follow-up     Right breast        Medication list and allergies have been reviewed with Joi Gerardo and updated as of today's date.     I have gone over all Medical, Surgical and Social History with Joi LIVINGSTON [place] and updated/added the information accordingly.     1. Have you been to the ER, urgent care clinic since your last visit?  Hospitalized since your last visit?No    2. Have you seen or consulted any other health care providers outside of the [facility] since your last visit?  Include any pap smears or colon screening. No

## 2025-02-26 ENCOUNTER — OFFICE VISIT (OUTPATIENT)
Age: 45
End: 2025-02-26
Payer: MEDICAID

## 2025-02-26 VITALS
TEMPERATURE: 97.3 F | BODY MASS INDEX: 39.68 KG/M2 | DIASTOLIC BLOOD PRESSURE: 88 MMHG | HEIGHT: 67 IN | RESPIRATION RATE: 16 BRPM | OXYGEN SATURATION: 94 % | HEART RATE: 93 BPM | WEIGHT: 252.8 LBS | SYSTOLIC BLOOD PRESSURE: 138 MMHG

## 2025-02-26 DIAGNOSIS — N64.4 BREAST PAIN, RIGHT: Primary | ICD-10-CM

## 2025-02-26 DIAGNOSIS — N60.41 PERIDUCTAL MASTITIS OF RIGHT BREAST: ICD-10-CM

## 2025-02-26 PROCEDURE — 3075F SYST BP GE 130 - 139MM HG: CPT | Performed by: SURGERY

## 2025-02-26 PROCEDURE — 99214 OFFICE O/P EST MOD 30 MIN: CPT | Performed by: SURGERY

## 2025-02-26 PROCEDURE — 3079F DIAST BP 80-89 MM HG: CPT | Performed by: SURGERY

## 2025-02-26 RX ORDER — GABAPENTIN 300 MG/1
300 CAPSULE ORAL 3 TIMES DAILY PRN
Qty: 60 CAPSULE | Refills: 0 | Status: SHIPPED | OUTPATIENT
Start: 2025-02-26 | End: 2025-05-26

## 2025-02-28 ASSESSMENT — ENCOUNTER SYMPTOMS: CHEST TIGHTNESS: 0

## 2025-02-28 NOTE — PROGRESS NOTES
CC:   Chief Complaint   Patient presents with    Follow-up     Right breast         Assessment:    ICD-10-CM    1. Breast pain, right  N64.4 gabapentin (NEURONTIN) 300 MG capsule      2. Periductal mastitis of right breast  N60.41           Plan: She is having a very difficult time physically with her right breast and intermittent mastitis and the recent devastating events of her 's passing. She will continue to wear supportive bras. I encouraged her to continue with daily betadine swabs to the nipple areola area and clean dressing over this. Avoid getting the breast wet in the shower or tub bathing at this time. Supplies were provided to the patient. I am happy to provide her with prescription for neurontin for her pain at this time but we cannot manage her pain long term. I encouraged her to continue to monitor her breast closely and I would like to see her back in 4 weeks or sooner should she have any questions or concerns. She agrees with this plan.         HPI:  Joi Gerardo is a 44 y.o. female who is here today for right breast pain and recent infection. She is well known to our clinic for ongoing right breast infections that heal and return. She states shortly before her husbands  she developed a very painful right breast that was swollen and opened and began draining pus. Home nursing came out to assist and instructed her on betadine and dressing changes which have helped dramatically. She describes sharp shooting pain in the right breast. She is very tearful today and states she will seek grief counselor assistance. She reports having support with friends and her family but her  came to all her appointments and was very supportive.     Allergies:  Allergies   Allergen Reactions    Hydrocodone-Acetaminophen Rash and Shortness Of Breath       Medication Review:  Current Outpatient Medications on File Prior to Visit   Medication Sig Dispense Refill    benazepril (LOTENSIN) 40 MG

## 2025-03-25 DIAGNOSIS — N64.4 BREAST PAIN, RIGHT: ICD-10-CM

## 2025-03-25 DIAGNOSIS — F41.9 ANXIETY: ICD-10-CM

## 2025-03-25 DIAGNOSIS — I10 ESSENTIAL (PRIMARY) HYPERTENSION: ICD-10-CM

## 2025-03-25 NOTE — PROGRESS NOTES
Joi Gerardo is a 44 y.o. female (: 1980)     Chief Complaint   Patient presents with    Follow-up     R Breast       Medication list and allergies have been reviewed with Joi Gerardo and updated as of today's date.     I have gone over all Medical, Surgical and Social History with Joi LIVINGSTON [place] and updated/added the information accordingly.     1. Have you been to the ER, urgent care clinic since your last visit?  Hospitalized since your last visit?No    2. Have you seen or consulted any other health care providers outside of the [facility] since your last visit?  Include any pap smears or colon screening. No

## 2025-03-26 ENCOUNTER — HOSPITAL ENCOUNTER (OUTPATIENT)
Facility: HOSPITAL | Age: 45
Discharge: HOME | End: 2025-03-29
Payer: MEDICAID

## 2025-03-26 ENCOUNTER — OFFICE VISIT (OUTPATIENT)
Age: 45
End: 2025-03-26
Payer: MEDICAID

## 2025-03-26 VITALS
DIASTOLIC BLOOD PRESSURE: 82 MMHG | HEART RATE: 78 BPM | SYSTOLIC BLOOD PRESSURE: 127 MMHG | WEIGHT: 252.6 LBS | TEMPERATURE: 97.2 F | BODY MASS INDEX: 39.65 KG/M2 | RESPIRATION RATE: 16 BRPM | OXYGEN SATURATION: 97 % | HEIGHT: 67 IN

## 2025-03-26 VITALS — BODY MASS INDEX: 38.3 KG/M2 | HEIGHT: 67 IN | WEIGHT: 244 LBS

## 2025-03-26 DIAGNOSIS — N61.1 ABSCESS OF BREAST: ICD-10-CM

## 2025-03-26 DIAGNOSIS — N64.4 BREAST PAIN, RIGHT: Primary | ICD-10-CM

## 2025-03-26 DIAGNOSIS — N64.4 BREAST PAIN, RIGHT: ICD-10-CM

## 2025-03-26 PROCEDURE — 99214 OFFICE O/P EST MOD 30 MIN: CPT

## 2025-03-26 PROCEDURE — 3079F DIAST BP 80-89 MM HG: CPT

## 2025-03-26 PROCEDURE — G0279 TOMOSYNTHESIS, MAMMO: HCPCS

## 2025-03-26 PROCEDURE — 76642 ULTRASOUND BREAST LIMITED: CPT

## 2025-03-26 PROCEDURE — 3074F SYST BP LT 130 MM HG: CPT

## 2025-03-26 RX ORDER — CLONIDINE HYDROCHLORIDE 0.2 MG/1
0.2 TABLET ORAL 2 TIMES DAILY
Qty: 180 TABLET | Refills: 0 | OUTPATIENT
Start: 2025-03-26

## 2025-03-26 RX ORDER — GABAPENTIN 300 MG/1
CAPSULE ORAL
Qty: 30 CAPSULE | Refills: 0 | Status: SHIPPED | OUTPATIENT
Start: 2025-03-26 | End: 2025-04-25

## 2025-03-26 RX ORDER — GABAPENTIN 300 MG/1
300 CAPSULE ORAL 3 TIMES DAILY
Qty: 270 CAPSULE | Refills: 1 | Status: SHIPPED | OUTPATIENT
Start: 2025-03-26 | End: 2025-09-22

## 2025-03-26 RX ORDER — LORAZEPAM 0.5 MG/1
TABLET ORAL
Qty: 90 TABLET | OUTPATIENT
Start: 2025-03-26

## 2025-03-26 NOTE — PROGRESS NOTES
CC:   Chief Complaint   Patient presents with    Follow-up     R Breast        Assessment:    ICD-10-CM    1. Breast pain, right  N64.4 US BREAST LIMITED RIGHT     gabapentin (NEURONTIN) 300 MG capsule     GANESH OZ DIGITAL DIAGNOSTIC UNILATERAL RIGHT      2. Abscess of breast  N61.1           Plan: I have ordered a right breast ultrasound and right breast diagnostic mammogram to be done today to rule out abscess in her right breast. I will call her with these results. I have called in a 10 day course of Augmentin and a gabapentin refill. I encouraged her to take this antibiotic to completion. She should continue to perform local wound care to this area with betadine application daily. I have provided her a prescription for bras.     I would like her to follow up with me or Dr. Medrano in 2 weeks or sooner with questions or concerns.     HPI:  Joi Gerardo is a 44 y.o. female who is here today for right breast pain and wound follow-up.She is well known to our clinic for ongoing right breast infections that heal and return. She states that she noticed redness and a firm area around her right nipple about 5 days ago. She states that this area is also painful and causes a nagging pulling sensation. She has been applying betadine to this area daily. She denies fevers or chills.     Allergies:  Allergies   Allergen Reactions    Hydrocodone-Acetaminophen Rash and Shortness Of Breath       Medication Review:  Current Outpatient Medications on File Prior to Visit   Medication Sig Dispense Refill    gabapentin (NEURONTIN) 300 MG capsule take 1 capsule by mouth three times a day AS NEEDED FOR PAIN for up to 60 doses maximum daily dose of 3 capsules 30 capsule 0    amLODIPine (NORVASC) 5 MG tablet Take 1 tablet by mouth daily 90 tablet 0    escitalopram (LEXAPRO) 10 MG tablet Take 1 tablet by mouth daily 30 tablet 2    ergocalciferol (ERGOCALCIFEROL) 1.25 MG (47341 UT) capsule Take 1 capsule by mouth every 7 days 12

## 2025-03-27 ENCOUNTER — OFFICE VISIT (OUTPATIENT)
Facility: CLINIC | Age: 45
End: 2025-03-27
Payer: MEDICAID

## 2025-03-27 VITALS
DIASTOLIC BLOOD PRESSURE: 85 MMHG | RESPIRATION RATE: 20 BRPM | SYSTOLIC BLOOD PRESSURE: 122 MMHG | BODY MASS INDEX: 39.87 KG/M2 | TEMPERATURE: 97.2 F | WEIGHT: 254 LBS | OXYGEN SATURATION: 97 % | HEART RATE: 77 BPM | HEIGHT: 67 IN

## 2025-03-27 DIAGNOSIS — N61.1 ABSCESS OF BREAST: ICD-10-CM

## 2025-03-27 DIAGNOSIS — F41.9 ANXIETY: ICD-10-CM

## 2025-03-27 DIAGNOSIS — F43.21 GRIEF: Primary | ICD-10-CM

## 2025-03-27 PROCEDURE — 3074F SYST BP LT 130 MM HG: CPT | Performed by: STUDENT IN AN ORGANIZED HEALTH CARE EDUCATION/TRAINING PROGRAM

## 2025-03-27 PROCEDURE — 3079F DIAST BP 80-89 MM HG: CPT | Performed by: STUDENT IN AN ORGANIZED HEALTH CARE EDUCATION/TRAINING PROGRAM

## 2025-03-27 PROCEDURE — 99214 OFFICE O/P EST MOD 30 MIN: CPT | Performed by: STUDENT IN AN ORGANIZED HEALTH CARE EDUCATION/TRAINING PROGRAM

## 2025-03-27 RX ORDER — LORAZEPAM 0.5 MG/1
0.5 TABLET ORAL EVERY 6 HOURS PRN
COMMUNITY
End: 2025-03-27 | Stop reason: SDUPTHER

## 2025-03-27 RX ORDER — LORAZEPAM 0.5 MG/1
0.5 TABLET ORAL EVERY 6 HOURS PRN
Qty: 90 TABLET | Refills: 0 | Status: SHIPPED | OUTPATIENT
Start: 2025-03-27 | End: 2025-04-26

## 2025-03-27 RX ORDER — ESCITALOPRAM OXALATE 20 MG/1
20 TABLET ORAL DAILY
Qty: 90 TABLET | Refills: 0 | Status: SHIPPED | OUTPATIENT
Start: 2025-03-27

## 2025-03-27 ASSESSMENT — PATIENT HEALTH QUESTIONNAIRE - PHQ9
SUM OF ALL RESPONSES TO PHQ QUESTIONS 1-9: 0
SUM OF ALL RESPONSES TO PHQ QUESTIONS 1-9: 0
2. FEELING DOWN, DEPRESSED OR HOPELESS: NOT AT ALL
SUM OF ALL RESPONSES TO PHQ QUESTIONS 1-9: 0
SUM OF ALL RESPONSES TO PHQ QUESTIONS 1-9: 0
1. LITTLE INTEREST OR PLEASURE IN DOING THINGS: NOT AT ALL

## 2025-03-27 NOTE — PROGRESS NOTES
Joi Gerardo (:  1980) is a 44 y.o. female,Established patient, here for evaluation of the following chief complaint(s):  Anxiety      Assessment & Plan  Grief   Chronic, not at goal (unstable),   Increasing Lexapro to 20mg today. Continue current dose of Ativan PRN  Pt has been in contact with psych and is working to schedule her initial appointment  Orders:    LORazepam (ATIVAN) 0.5 MG tablet; Take 1 tablet by mouth every 6 hours as needed for Anxiety for up to 30 days. Max Daily Amount: 2 mg    escitalopram (LEXAPRO) 20 MG tablet; Take 1 tablet by mouth daily    Anxiety   Chronic, not at goal (unstable),   Increasing Lexapro to 20mg today. Continue current dose of Ativan PRN  Pt has been in contact with psych and is working to schedule her initial appointment  Orders:  Orders:    LORazepam (ATIVAN) 0.5 MG tablet; Take 1 tablet by mouth every 6 hours as needed for Anxiety for up to 30 days. Max Daily Amount: 2 mg    escitalopram (LEXAPRO) 20 MG tablet; Take 1 tablet by mouth daily    Abscess of breast   Monitored by specialist- no acute findings meriting change in the plan  Discussed with the ongoing stress, patient is currently endorsing any contributing to slow progression of her wound healing.  Reviewed recent notes, labs and imaging from breast surgeon           Return in about 8 weeks (around 2025) for Anxiety.    Subjective       Grief- Started on Lexapro 10mg and Ativan 0.5mg PRN in February. He reports that since her  passed away she has had a difficult time.  She notes that she has good days and bad days but there are times when she has a hard time sleeping as she thinking of her  and Ativan has helped with this.  She is in the process of working to schedule her initial psychiatry appointment.  She has been also dealing with ongoing issues with her breast and is following with a breast surgeon.  She recently had updated imaging imaging performed which showed several

## 2025-03-27 NOTE — ASSESSMENT & PLAN NOTE
Monitored by specialist- no acute findings meriting change in the plan  Discussed with the ongoing stress, patient is currently endorsing any contributing to slow progression of her wound healing.  Reviewed recent notes, labs and imaging from breast surgeon

## 2025-04-08 ENCOUNTER — OFFICE VISIT (OUTPATIENT)
Age: 45
End: 2025-04-08
Payer: MEDICAID

## 2025-04-08 VITALS
HEIGHT: 67 IN | SYSTOLIC BLOOD PRESSURE: 136 MMHG | OXYGEN SATURATION: 96 % | BODY MASS INDEX: 40.12 KG/M2 | WEIGHT: 255.6 LBS | RESPIRATION RATE: 16 BRPM | DIASTOLIC BLOOD PRESSURE: 84 MMHG | HEART RATE: 91 BPM | TEMPERATURE: 97.3 F

## 2025-04-08 DIAGNOSIS — N64.4 BREAST PAIN, RIGHT: Primary | ICD-10-CM

## 2025-04-08 PROCEDURE — 99213 OFFICE O/P EST LOW 20 MIN: CPT

## 2025-04-08 PROCEDURE — 3075F SYST BP GE 130 - 139MM HG: CPT

## 2025-04-08 PROCEDURE — 3079F DIAST BP 80-89 MM HG: CPT

## 2025-04-08 ASSESSMENT — ENCOUNTER SYMPTOMS
SHORTNESS OF BREATH: 0
VOMITING: 0
NAUSEA: 0
COLOR CHANGE: 0

## 2025-04-08 NOTE — PROGRESS NOTES
Joi Gerardo is a 44 y.o. female (: 1980)     Chief Complaint   Patient presents with    Follow-up     Right breast       Medication list and allergies have been reviewed with Joi Gerardo and updated as of today's date.     I have gone over all Medical, Surgical and Social History with Joi LIVINGSTON [place] and updated/added the information accordingly.     1. Have you been to the ER, urgent care clinic since your last visit?  Hospitalized since your last visit?No    2. Have you seen or consulted any other health care providers outside of the [facility] since your last visit?  Include any pap smears or colon screening. No    
AS NEEDED FOR PAIN for up to 60 doses maximum daily dose of 3 capsules 30 capsule 0    gabapentin (NEURONTIN) 300 MG capsule Take 1 capsule by mouth in the morning, at noon, and at bedtime for 180 days. Intended supply: 90 days Max Daily Amount: 900 mg 270 capsule 1    amLODIPine (NORVASC) 5 MG tablet Take 1 tablet by mouth daily 90 tablet 0    ergocalciferol (ERGOCALCIFEROL) 1.25 MG (62824 UT) capsule Take 1 capsule by mouth every 7 days 12 capsule 1    cloNIDine (CATAPRES) 0.2 MG tablet take 1 tablet by mouth twice a day 180 tablet 0    ondansetron (ZOFRAN) 4 MG tablet Take 1 tablet by mouth daily as needed for Nausea or Vomiting 30 tablet 0    acetaminophen (TYLENOL) 500 MG tablet Take 1 tablet by mouth 4 times daily as needed for Pain 30 tablet 0    omeprazole (PRILOSEC) 40 MG delayed release capsule Take 1 capsule by mouth every morning (before breakfast) Open capsule and take contents. Take daily for 60 days following surgery. 60 capsule 0    benazepril (LOTENSIN) 40 MG tablet take 1 tablet by mouth once daily (Patient not taking: Reported on 3/26/2025) 90 tablet 0     No current facility-administered medications on file prior to visit.       Systems Review:  Review of Systems   Constitutional:  Negative for chills and fever.   Respiratory:  Negative for shortness of breath.    Cardiovascular:  Negative for chest pain.   Gastrointestinal:  Negative for nausea and vomiting.   Skin:  Negative for color change, pallor and rash.   Hematological:  Negative for adenopathy. Does not bruise/bleed easily.       PMH:  Past Medical History:   Diagnosis Date    Arthritis     SPINE & KNEES    GERD (gastroesophageal reflux disease)     Heroin abuse     HTN (hypertension)     Irritable bowel syndrome     Obesity 2021    Currently seeking bariatric surgery    Osteoarthritis 2013    Lower lumbar spine       Surgical History:  Past Surgical History:   Procedure Laterality Date    BREAST SURGERY Right 9/10/2024    INCISION AND

## 2025-04-25 ENCOUNTER — OFFICE VISIT (OUTPATIENT)
Age: 45
End: 2025-04-25
Payer: MEDICAID

## 2025-04-25 ENCOUNTER — HOSPITAL ENCOUNTER (OUTPATIENT)
Facility: HOSPITAL | Age: 45
Setting detail: SPECIMEN
Discharge: HOME | End: 2025-04-28

## 2025-04-25 VITALS
RESPIRATION RATE: 20 BRPM | HEART RATE: 109 BPM | WEIGHT: 255.2 LBS | TEMPERATURE: 97.3 F | OXYGEN SATURATION: 100 % | HEIGHT: 67 IN | DIASTOLIC BLOOD PRESSURE: 84 MMHG | SYSTOLIC BLOOD PRESSURE: 130 MMHG | BODY MASS INDEX: 40.06 KG/M2

## 2025-04-25 DIAGNOSIS — N60.41 PERIDUCTAL MASTITIS OF RIGHT BREAST: ICD-10-CM

## 2025-04-25 DIAGNOSIS — N64.4 BREAST PAIN, RIGHT: ICD-10-CM

## 2025-04-25 DIAGNOSIS — N61.1 ABSCESS OF BREAST: ICD-10-CM

## 2025-04-25 DIAGNOSIS — N64.4 BREAST PAIN, RIGHT: Primary | ICD-10-CM

## 2025-04-25 LAB — SENTARA SPECIMEN COLLECTION: NORMAL

## 2025-04-25 PROCEDURE — 3075F SYST BP GE 130 - 139MM HG: CPT

## 2025-04-25 PROCEDURE — 99001 SPECIMEN HANDLING PT-LAB: CPT

## 2025-04-25 PROCEDURE — 99214 OFFICE O/P EST MOD 30 MIN: CPT

## 2025-04-25 PROCEDURE — 3079F DIAST BP 80-89 MM HG: CPT

## 2025-04-25 RX ORDER — GABAPENTIN 300 MG/1
300 CAPSULE ORAL 3 TIMES DAILY
Qty: 270 CAPSULE | Refills: 1 | Status: SHIPPED | OUTPATIENT
Start: 2025-04-25 | End: 2025-10-22

## 2025-04-25 ASSESSMENT — ENCOUNTER SYMPTOMS
VOMITING: 0
SHORTNESS OF BREATH: 0
COLOR CHANGE: 0
NAUSEA: 0

## 2025-04-25 NOTE — PROGRESS NOTES
Joi Gerardo is a 44 y.o. female (: 1980)     Chief Complaint   Patient presents with    Follow-up     Right breast wound        Medication list and allergies have been reviewed with Joi Gerardo and updated as of today's date.     I have gone over all Medical, Surgical and Social History with Joi Gerardo and updated/added the information accordingly.     1. Have you been to the ER, urgent care clinic since your last visit?  Hospitalized since your last visit?No    2. Have you seen or consulted any other health care providers outside of the [facility] since your last visit?  Include any pap smears or colon screening. No    
    Electronically signed by Goldie ANDERSEN OZ DIGITAL DIAGNOSTIC UNILATERAL RIGHT  Narrative: DIGITAL MAMMOGRAM DIAGNOSTIC right with tomography and CAD    HISTORY: Patient had right breast subareolar duct excision October 2024  revealing skin and breast tissue with granulation tissue formation and chronic  inflammation. Patient has new redness, warmth around the right nipple areolar  region with palpable lump    COMPARISON: 8/14/2024, right ultrasound 10/9/2024 and prior studies.    FINDINGS:  The patient indicated the area of palpable concern retroareolar right breast  which was marked with a triangular shaped marker.  Full field digital images of the right breast were obtained including 2D 3D CC,  MLO and mediolateral views of the right breast and 2D 3D CC and MLO spot  compression views of the retroareolar right breast in the area of palpable  concern.    There is new postsurgical change in the retroareolar right breast. Skin  thickening and areolar thickening is noted in the region. There is increased  density in the retroareolar right breast. There are a couple of tiny dots of  rounded lucency in the retroareolar asymmetry possibly tiny areas of fat  necrosis versus soft tissue air or gas.    No suspicious calcifications right breast. The right axillary lymph nodes appear  mildly more prominent..    Interpretation performed in conjunction with computer assisted detection (iCAD  Second Look 7.2-H).    Ultrasound breast limited diagnostic: Right    TECHNIQUE:     Targeted sonographic imaging of the right breast by the sonography technologist  with linear high resolution transducer. Grey scale, color imaging and cine  images obtained when indicated, with representative static images provided.    FINDINGS:  Targeted assessment of the retroareolar right breast 6:00 2 cm from the nipple  demonstrates architectural distortion and somewhat elongated irregular  hypoechoic mass measuring 2.2 x 0.5 x 0.8 cm.

## 2025-04-27 LAB
Lab: ABNORMAL
WOUND CULTURE: ABNORMAL

## 2025-05-02 ENCOUNTER — OFFICE VISIT (OUTPATIENT)
Age: 45
End: 2025-05-02
Payer: MEDICAID

## 2025-05-02 VITALS
HEIGHT: 67 IN | BODY MASS INDEX: 40.34 KG/M2 | WEIGHT: 257 LBS | TEMPERATURE: 97.1 F | DIASTOLIC BLOOD PRESSURE: 78 MMHG | OXYGEN SATURATION: 97 % | RESPIRATION RATE: 18 BRPM | HEART RATE: 87 BPM | SYSTOLIC BLOOD PRESSURE: 108 MMHG

## 2025-05-02 DIAGNOSIS — N64.4 BREAST PAIN, RIGHT: Primary | ICD-10-CM

## 2025-05-02 DIAGNOSIS — Z72.0 TOBACCO ABUSE: ICD-10-CM

## 2025-05-02 PROCEDURE — 3074F SYST BP LT 130 MM HG: CPT

## 2025-05-02 PROCEDURE — 99214 OFFICE O/P EST MOD 30 MIN: CPT

## 2025-05-02 PROCEDURE — 3078F DIAST BP <80 MM HG: CPT

## 2025-05-02 RX ORDER — DOXYCYCLINE HYCLATE 100 MG
100 TABLET ORAL 2 TIMES DAILY
Qty: 10 TABLET | Refills: 0 | Status: SHIPPED | OUTPATIENT
Start: 2025-05-02 | End: 2025-05-07

## 2025-05-02 ASSESSMENT — ENCOUNTER SYMPTOMS
VOMITING: 0
SHORTNESS OF BREATH: 0
COLOR CHANGE: 0
NAUSEA: 0

## 2025-05-02 NOTE — PROGRESS NOTES
CC:   Chief Complaint   Patient presents with    Follow-up     Wound check, right breast        Assessment:    ICD-10-CM    1. Breast pain, right  N64.4       2. Tobacco abuse  Z72.0           Plan: I personally reviewed her right breast wound culture from 4/26/2025 demonstrating actinotignum schaalii. I have called in a 5 day course of doxycycline for her. She may use a saline-based wound cleanser to clean her breast. She should change her dressings daily or as needed for drainage. We discussed smoking cessation again at today's visit and she is scheduled with her PCP on 5/22/2025 to discuss medication therapy. I would like her to follow up with me or Dr. Medrano in 2 weeks or sooner with questions or concerns.     HPI:  Joi Gerardo is a 44 y.o. female who is here today for a wound check of her right breast. She states that her breast continues to drain scant serosanguinous fluid. The drainage has decreased some since our last visit. She is also experiencing itching at this site.  She has been cleaning her wound daily and has been covering her wound with a Covaderm dressing.  She denies fevers or chills.       Allergies:  Allergies   Allergen Reactions    Hydrocodone-Acetaminophen Rash and Shortness Of Breath       Medication Review:  Current Outpatient Medications on File Prior to Visit   Medication Sig Dispense Refill    gabapentin (NEURONTIN) 300 MG capsule Take 1 capsule by mouth 3 times daily for 180 days. Intended supply: 90 days Max Daily Amount: 900 mg 270 capsule 1    escitalopram (LEXAPRO) 20 MG tablet Take 1 tablet by mouth daily 90 tablet 0    gabapentin (NEURONTIN) 300 MG capsule Take 1 capsule by mouth in the morning, at noon, and at bedtime for 180 days. Intended supply: 90 days Max Daily Amount: 900 mg 270 capsule 1    amLODIPine (NORVASC) 5 MG tablet Take 1 tablet by mouth daily 90 tablet 0    ergocalciferol (ERGOCALCIFEROL) 1.25 MG (02250 UT) capsule Take 1 capsule by mouth every 7 days

## 2025-05-14 ENCOUNTER — TRANSCRIBE ORDERS (OUTPATIENT)
Facility: HOSPITAL | Age: 45
End: 2025-05-14

## 2025-05-14 DIAGNOSIS — Z12.31 VISIT FOR SCREENING MAMMOGRAM: Primary | ICD-10-CM

## 2025-05-20 ENCOUNTER — TELEPHONE (OUTPATIENT)
Age: 45
End: 2025-05-20

## 2025-05-21 ENCOUNTER — OFFICE VISIT (OUTPATIENT)
Age: 45
End: 2025-05-21
Payer: MEDICAID

## 2025-05-21 VITALS
SYSTOLIC BLOOD PRESSURE: 128 MMHG | DIASTOLIC BLOOD PRESSURE: 82 MMHG | HEIGHT: 67 IN | HEART RATE: 74 BPM | OXYGEN SATURATION: 97 % | WEIGHT: 264 LBS | BODY MASS INDEX: 41.44 KG/M2 | TEMPERATURE: 98.6 F

## 2025-05-21 DIAGNOSIS — N64.4 BREAST PAIN, RIGHT: Primary | ICD-10-CM

## 2025-05-21 PROCEDURE — 99213 OFFICE O/P EST LOW 20 MIN: CPT

## 2025-05-21 PROCEDURE — 3074F SYST BP LT 130 MM HG: CPT

## 2025-05-21 PROCEDURE — 3079F DIAST BP 80-89 MM HG: CPT

## 2025-05-21 NOTE — PROGRESS NOTES
air/gas within  the potential early phlegmon on mammography and ultrasound, consider covering  the patient with antibiotics appropriate for nonaerobic organisms as well.    Mildly prominent right axillary lymphadenopathy likely reactive.    Clinical follow-up to resolution recommended. Sonographic follow-up in 2 to 3  months to include the retroareolar right breast and the right axillary lymph  nodes is recommended for the probably benign findings.    The patient is due for annual mammography in August 2025.    Breast Density:  Density B: There are scattered areas of fibroglandular density.    BIRADS :  Category 3: Probably benign     Electronically signed by Goldie Middleton    Performing Facility: [facility] [address] [place], [place] [address] Phone: [phone]         Physical Exam:  /82   Pulse 74   Temp 98.6 °F (37 °C)   Ht 1.702 m (5' 7\")   Wt 119.7 kg (264 lb)   SpO2 97%   BMI 41.35 kg/m²  BMI: Body mass index is 41.35 kg/m².    Physical Exam  Constitutional:       Appearance: Normal appearance. She is obese.   HENT:      Head: Normocephalic and atraumatic.   Eyes:      Extraocular Movements: Extraocular movements intact.      Conjunctiva/sclera: Conjunctivae normal.      Pupils: Pupils are equal, round, and reactive to light.   Cardiovascular:      Pulses: Normal pulses.   Pulmonary:      Effort: Pulmonary effort is normal.   Chest:   Breasts:     Right: No swelling, bleeding, inverted nipple, mass or nipple discharge.      Left: Normal.      Comments: Scant white drainage from pinpoint wound at areola edge. No fluctuance, induration, or overlying erythema present on exam  Lymphadenopathy:      Upper Body:      Right upper body: No supraclavicular, axillary or pectoral adenopathy.   Skin:     General: Skin is warm and dry.   Neurological:      Mental Status: She is alert and oriented to person, place, and time.   Psychiatric:

## 2025-05-22 ENCOUNTER — CLINICAL DOCUMENTATION (OUTPATIENT)
Age: 45
End: 2025-05-22

## 2025-05-22 ENCOUNTER — OFFICE VISIT (OUTPATIENT)
Facility: CLINIC | Age: 45
End: 2025-05-22
Payer: MEDICAID

## 2025-05-22 VITALS
WEIGHT: 261 LBS | HEIGHT: 67 IN | TEMPERATURE: 97 F | RESPIRATION RATE: 20 BRPM | DIASTOLIC BLOOD PRESSURE: 78 MMHG | HEART RATE: 78 BPM | OXYGEN SATURATION: 96 % | SYSTOLIC BLOOD PRESSURE: 116 MMHG | BODY MASS INDEX: 40.97 KG/M2

## 2025-05-22 DIAGNOSIS — F43.21 GRIEF: ICD-10-CM

## 2025-05-22 DIAGNOSIS — F17.200 TOBACCO USE DISORDER: Primary | ICD-10-CM

## 2025-05-22 DIAGNOSIS — Z71.6 TOBACCO ABUSE COUNSELING: ICD-10-CM

## 2025-05-22 DIAGNOSIS — F41.9 ANXIETY: ICD-10-CM

## 2025-05-22 PROCEDURE — 99406 BEHAV CHNG SMOKING 3-10 MIN: CPT | Performed by: STUDENT IN AN ORGANIZED HEALTH CARE EDUCATION/TRAINING PROGRAM

## 2025-05-22 PROCEDURE — 99214 OFFICE O/P EST MOD 30 MIN: CPT | Performed by: STUDENT IN AN ORGANIZED HEALTH CARE EDUCATION/TRAINING PROGRAM

## 2025-05-22 PROCEDURE — 3074F SYST BP LT 130 MM HG: CPT | Performed by: STUDENT IN AN ORGANIZED HEALTH CARE EDUCATION/TRAINING PROGRAM

## 2025-05-22 PROCEDURE — 3078F DIAST BP <80 MM HG: CPT | Performed by: STUDENT IN AN ORGANIZED HEALTH CARE EDUCATION/TRAINING PROGRAM

## 2025-05-22 RX ORDER — POLYETHYLENE GLYCOL 3350 17 G
2 POWDER IN PACKET (EA) ORAL PRN
Qty: 100 EACH | Refills: 3 | Status: SHIPPED | OUTPATIENT
Start: 2025-05-22

## 2025-05-22 RX ORDER — LORAZEPAM 0.5 MG/1
0.5 TABLET ORAL EVERY 6 HOURS PRN
COMMUNITY
End: 2025-05-22 | Stop reason: SDUPTHER

## 2025-05-22 RX ORDER — LORAZEPAM 0.5 MG/1
0.5 TABLET ORAL EVERY 6 HOURS PRN
Qty: 120 TABLET | Refills: 0 | Status: SHIPPED | OUTPATIENT
Start: 2025-05-22 | End: 2025-06-21

## 2025-05-22 RX ORDER — NICOTINE 21 MG/24HR
1 PATCH, TRANSDERMAL 24 HOURS TRANSDERMAL DAILY
Qty: 60 PATCH | Refills: 0 | Status: SHIPPED | OUTPATIENT
Start: 2025-05-22 | End: 2025-07-21

## 2025-05-22 RX ORDER — LORAZEPAM 0.5 MG/1
0.5 TABLET ORAL EVERY 6 HOURS PRN
Status: CANCELLED | OUTPATIENT
Start: 2025-05-22

## 2025-05-22 RX ORDER — ESCITALOPRAM OXALATE 20 MG/1
20 TABLET ORAL DAILY
Qty: 90 TABLET | Refills: 0 | Status: CANCELLED | OUTPATIENT
Start: 2025-05-22

## 2025-05-22 RX ORDER — ESCITALOPRAM OXALATE 20 MG/1
20 TABLET ORAL DAILY
Qty: 90 TABLET | Refills: 1 | Status: SHIPPED | OUTPATIENT
Start: 2025-05-22

## 2025-05-22 ASSESSMENT — PATIENT HEALTH QUESTIONNAIRE - PHQ9
SUM OF ALL RESPONSES TO PHQ QUESTIONS 1-9: 0
1. LITTLE INTEREST OR PLEASURE IN DOING THINGS: NOT AT ALL
SUM OF ALL RESPONSES TO PHQ QUESTIONS 1-9: 0
2. FEELING DOWN, DEPRESSED OR HOPELESS: NOT AT ALL

## 2025-05-22 ASSESSMENT — ENCOUNTER SYMPTOMS
NAUSEA: 0
SHORTNESS OF BREATH: 0
SHORTNESS OF BREATH: 0
VOMITING: 0
ABDOMINAL PAIN: 0
COLOR CHANGE: 0

## 2025-05-22 NOTE — PROGRESS NOTES
Have you been to the ER, urgent care clinic since your last visit?  Hospitalized since your last visit?   NO    Have you seen or consulted any other health care providers outside our system since your last visit?   NO     “Have you had a pap smear?”    NO    No cervical cancer screening on file            
appearing, no acute distress  Respiratory: Clear to auscultation bilaterally, no wheezing, rales or rhonchi  Cardiovascular: Regular rate and rhythm, no murmurs, rubs, or gallops       On this date 5/22/2025 I have spent 30 minutes reviewing previous notes, test results and face to face with the patient discussing the diagnosis and importance of compliance with the treatment plan as well as documenting on the day of the visit.    The patient (or guardian, if applicable) and other individuals in attendance with the patient were advised that Artificial Intelligence will be utilized during this visit to record, process the conversation to generate a clinical note and to support improvement of the AI technology. The patient (or guardian, if applicable) and other individuals in attendance at the appointment consented to the use of AI, including the recording.      An electronic signature was used to authenticate this note.    --Lena Pavon, DO

## 2025-05-22 NOTE — PROGRESS NOTES
Patient was referred to [facility] in [place]. Faxed records to [phone].     Office #[phone] or [phone]

## 2025-05-28 ENCOUNTER — TELEPHONE (OUTPATIENT)
Facility: CLINIC | Age: 45
End: 2025-05-28

## 2025-05-28 NOTE — TELEPHONE ENCOUNTER
Patient was advised per clinical staff that given that she is on methadone she will no longer be able to be on Ativan simultaneously.  No was not listed in her current medication list and after reviewing patient's chart in October 2024 and appears that patient had a urine drug screen ordered by another provider and at that time methadone was present.  We have discussed this with the patient and we will be discontinuing her Ativan as it can cross-react and cause further overdose.  Patient understands agrees with plan

## 2025-05-28 NOTE — TELEPHONE ENCOUNTER
Anabel from [facility] called in today to let Doctor know that the LORazepam (ATIVAN) 0.5 MG tablets she prescribed patient interferes with the Methadone 150mg that the patient is taking for opoid disorder and may cause patient to overdose if she's taking both medications.    You reach Anabel at [phone]

## 2025-06-30 ENCOUNTER — TELEPHONE (OUTPATIENT)
Age: 45
End: 2025-06-30

## 2025-06-30 NOTE — TELEPHONE ENCOUNTER
Patient called and stated that her surgical site/right breast has a small amount of redness, swelling and discharging pus. She stated that it has been happening since last Monday.

## 2025-07-02 DIAGNOSIS — I10 ESSENTIAL (PRIMARY) HYPERTENSION: ICD-10-CM

## 2025-07-03 RX ORDER — AMLODIPINE BESYLATE 5 MG/1
5 TABLET ORAL DAILY
Qty: 90 TABLET | Refills: 1 | Status: SHIPPED | OUTPATIENT
Start: 2025-07-03

## 2025-07-03 NOTE — TELEPHONE ENCOUNTER
Ms. Gerardo is requesting refills of:    Requested Prescriptions     Pending Prescriptions Disp Refills    amLODIPine (NORVASC) 5 MG tablet [Pharmacy Med Name: AMLODIPINE BESYLATE 5 MG TAB] 90 tablet 0     Sig: TAKE 1 TABLET BY MOUTH ONCE DAILY       to be sent to   [facility] [id number] [place], [place] - [address] - P [phone] - F [phone]  [address] [address]  Phone: [phone] Fax: [phone]  .     LAST OFFICE VISIT:  5/22/2025     UPCOMING APPOINTMENT(S):  Future Appointments   Date Time Provider Department Center   7/17/2025  1:15 PM Lena Pavon DO [facility] DEP   8/19/2025  1:20 PM [facility] RM 3 3D [facility]   8/26/2025  2:00 PM Raya Durant PA-C [facility] BS [facility]         Provided notified

## 2025-07-17 ENCOUNTER — TELEMEDICINE (OUTPATIENT)
Facility: CLINIC | Age: 45
End: 2025-07-17
Payer: MEDICAID

## 2025-07-17 DIAGNOSIS — N61.1 ABSCESS OF BREAST: ICD-10-CM

## 2025-07-17 DIAGNOSIS — I10 ESSENTIAL (PRIMARY) HYPERTENSION: Primary | ICD-10-CM

## 2025-07-17 DIAGNOSIS — F41.9 ANXIETY: ICD-10-CM

## 2025-07-17 DIAGNOSIS — T14.8XXA SURGICAL WOUND PRESENT: ICD-10-CM

## 2025-07-17 PROCEDURE — 99214 OFFICE O/P EST MOD 30 MIN: CPT | Performed by: STUDENT IN AN ORGANIZED HEALTH CARE EDUCATION/TRAINING PROGRAM

## 2025-07-17 RX ORDER — BUSPIRONE HYDROCHLORIDE 5 MG/1
5 TABLET ORAL 2 TIMES DAILY
Qty: 180 TABLET | Refills: 0 | Status: SHIPPED | OUTPATIENT
Start: 2025-07-17 | End: 2025-10-15

## 2025-07-17 RX ORDER — METHADONE HYDROCHLORIDE 5 MG/5ML
SOLUTION ORAL EVERY 4 HOURS PRN
COMMUNITY

## 2025-07-17 ASSESSMENT — PATIENT HEALTH QUESTIONNAIRE - PHQ9
SUM OF ALL RESPONSES TO PHQ QUESTIONS 1-9: 0
1. LITTLE INTEREST OR PLEASURE IN DOING THINGS: NOT AT ALL
2. FEELING DOWN, DEPRESSED OR HOPELESS: NOT AT ALL
SUM OF ALL RESPONSES TO PHQ QUESTIONS 1-9: 0

## 2025-07-17 ASSESSMENT — ENCOUNTER SYMPTOMS
SHORTNESS OF BREATH: 0
ABDOMINAL PAIN: 0

## 2025-07-17 NOTE — PROGRESS NOTES
Joi Gerardo, was evaluated through a synchronous (real-time) audio-video encounter. The patient (or guardian if applicable) is aware that this is a billable service, which includes applicable co-pays. This Virtual Visit was conducted with patient's (and/or legal guardian's) consent. Patient identification was verified, and a caregiver was present when appropriate.   The patient was located at Home: [address] [address]  Provider was located at Home (Appt Dept State): [place]  Confirm you are appropriately licensed, registered, or certified to deliver care in the state where the patient is located as indicated above. If you are not or unsure, please re-schedule the visit: Yes, I confirm.     Joi Gerardo (:  1980) is a Established patient, presenting virtually for evaluation of the following:      Below is the assessment and plan developed based on review of pertinent history, physical exam, labs, studies, and medications.     Assessment & Plan  1. Anxiety.  - Lexapro 20 mg once a day is generally effective, though some days are better than others.  - Experiences agitation and anxiety, particularly when upset.  - Will continue taking Lexapro 20 mg daily.  - BuSpar prescribed to be taken twice daily, starting with once daily for the first two weeks, then increasing to twice daily.    2. Breast wound.  - Reports that her breast wound has reopened and is leaking green pus.  - Has been cleaning it and applying a bandage but has not received packing strip tape.  - Referral to [facility] has been made.  - Will  work to help coordinate with the [facility] to expedite her appointment.    3. Opioid use disorder.  - Has been on methadone for opioid use disorder, which was not previously noted in her chart.  - Potential for overdose with Ativan was discussed, and it was decided to discontinue Ativan.  - Will establish care with psychiatry for medication

## 2025-07-30 ENCOUNTER — TELEPHONE (OUTPATIENT)
Age: 45
End: 2025-07-30

## 2025-07-31 NOTE — TELEPHONE ENCOUNTER
Spoke with [facility], they can treat pt for the wound closure but she cannot have any current infection, they will route her referral from May to their schedulers and reach out to pt, please see pcp Librado note from 07/17

## 2025-08-04 ENCOUNTER — OFFICE VISIT (OUTPATIENT)
Age: 45
End: 2025-08-04
Payer: MEDICAID

## 2025-08-04 ENCOUNTER — HOSPITAL ENCOUNTER (OUTPATIENT)
Facility: HOSPITAL | Age: 45
Setting detail: SPECIMEN
Discharge: HOME | End: 2025-08-07

## 2025-08-04 VITALS
WEIGHT: 271 LBS | SYSTOLIC BLOOD PRESSURE: 154 MMHG | BODY MASS INDEX: 42.53 KG/M2 | HEIGHT: 67 IN | DIASTOLIC BLOOD PRESSURE: 99 MMHG | HEART RATE: 82 BPM

## 2025-08-04 DIAGNOSIS — L03.818 CELLULITIS OF OTHER SPECIFIED SITE: ICD-10-CM

## 2025-08-04 DIAGNOSIS — N64.4 BREAST PAIN, RIGHT: Primary | ICD-10-CM

## 2025-08-04 DIAGNOSIS — Z72.0 TOBACCO ABUSE: ICD-10-CM

## 2025-08-04 PROCEDURE — 3080F DIAST BP >= 90 MM HG: CPT

## 2025-08-04 PROCEDURE — 99001 SPECIMEN HANDLING PT-LAB: CPT

## 2025-08-04 PROCEDURE — 3077F SYST BP >= 140 MM HG: CPT

## 2025-08-04 PROCEDURE — 99214 OFFICE O/P EST MOD 30 MIN: CPT

## 2025-08-04 RX ORDER — GABAPENTIN 300 MG/1
300 CAPSULE ORAL 3 TIMES DAILY
Qty: 270 CAPSULE | Refills: 1 | Status: SHIPPED | OUTPATIENT
Start: 2025-08-04 | End: 2026-01-31

## 2025-08-04 ASSESSMENT — ENCOUNTER SYMPTOMS
ABDOMINAL PAIN: 0
VOMITING: 0
SHORTNESS OF BREATH: 0
NAUSEA: 0

## 2025-08-05 LAB
Lab: NORMAL
SENTARA SPECIMEN COLLECTION: NORMAL
WOUND CULTURE: NORMAL

## 2025-08-18 ENCOUNTER — OFFICE VISIT (OUTPATIENT)
Age: 45
End: 2025-08-18
Payer: MEDICAID

## 2025-08-18 VITALS
OXYGEN SATURATION: 99 % | HEART RATE: 99 BPM | TEMPERATURE: 97.5 F | SYSTOLIC BLOOD PRESSURE: 130 MMHG | DIASTOLIC BLOOD PRESSURE: 82 MMHG | WEIGHT: 275.2 LBS | HEIGHT: 67 IN | RESPIRATION RATE: 20 BRPM | BODY MASS INDEX: 43.19 KG/M2

## 2025-08-18 DIAGNOSIS — N60.41 PERIDUCTAL MASTITIS OF RIGHT BREAST: Primary | ICD-10-CM

## 2025-08-18 PROCEDURE — 3075F SYST BP GE 130 - 139MM HG: CPT | Performed by: SURGERY

## 2025-08-18 PROCEDURE — 99212 OFFICE O/P EST SF 10 MIN: CPT | Performed by: SURGERY

## 2025-08-18 PROCEDURE — 3079F DIAST BP 80-89 MM HG: CPT | Performed by: SURGERY

## 2025-08-20 ENCOUNTER — TELEPHONE (OUTPATIENT)
Facility: CLINIC | Age: 45
End: 2025-08-20

## 2025-08-20 DIAGNOSIS — R92.8 ABNORMAL MAMMOGRAM OF RIGHT BREAST: Primary | ICD-10-CM

## 2025-08-22 DIAGNOSIS — I10 ESSENTIAL (PRIMARY) HYPERTENSION: ICD-10-CM

## 2025-08-22 DIAGNOSIS — F41.9 ANXIETY: ICD-10-CM

## 2025-08-22 RX ORDER — ERGOCALCIFEROL 1.25 MG/1
50000 CAPSULE ORAL
Qty: 12 CAPSULE | Refills: 1 | Status: SHIPPED | OUTPATIENT
Start: 2025-08-22

## 2025-08-22 RX ORDER — CLONIDINE HYDROCHLORIDE 0.2 MG/1
0.2 TABLET ORAL 2 TIMES DAILY
Qty: 180 TABLET | Refills: 1 | Status: SHIPPED | OUTPATIENT
Start: 2025-08-22

## 2025-08-22 RX ORDER — BUSPIRONE HYDROCHLORIDE 5 MG/1
5 TABLET ORAL 2 TIMES DAILY
Qty: 180 TABLET | Refills: 1 | Status: SHIPPED | OUTPATIENT
Start: 2025-08-22 | End: 2026-02-18

## 2025-08-26 ENCOUNTER — TELEPHONE (OUTPATIENT)
Age: 45
End: 2025-08-26

## 2025-08-27 ENCOUNTER — TELEPHONE (OUTPATIENT)
Facility: HOSPITAL | Age: 45
End: 2025-08-27

## 2025-08-27 RX ORDER — METHYLPREDNISOLONE 4 MG/1
TABLET ORAL
Qty: 1 KIT | Refills: 0 | Status: SHIPPED | OUTPATIENT
Start: 2025-08-27 | End: 2025-09-02

## (undated) DEVICE — COVER,LIGHT HANDLE,FLX,1/PK: Brand: MEDLINE INDUSTRIES, INC.

## (undated) DEVICE — SUTURE MONOCRYL SZ 4-0 L27IN ABSRB UD L24MM PS-1 3/8 CIR PRIM Y935H

## (undated) DEVICE — SHEARS ENDOSCP L9CM CRV HARM FOCS +

## (undated) DEVICE — ELECTRODE PT RET AD L9FT HI MOIST COND ADH HYDRGEL CORDED

## (undated) DEVICE — APPLIER CLP L9.38IN M LIG TI DISP STR RNG HNDL LIGACLP

## (undated) DEVICE — STRIP SKIN CLSR W1XL5IN NYLON REINF CURAD STERIL

## (undated) DEVICE — SUTURE PERMA-HAND SZ 2-0 L30IN NONABSORBABLE BLK L26MM SH K833H

## (undated) DEVICE — NEEDLE 25GA X 1.5 IN ECLIPSE

## (undated) DEVICE — SUTURE VICRYL + SZ 3-0 L27IN ABSRB UD L26MM SH 1/2 CIR VCP416H

## (undated) DEVICE — MASTISOL ADHESIVE LIQ 2/3ML

## (undated) DEVICE — GLOVE SURG SZ 6 CRM LTX FREE POLYISOPRENE POLYMER BEAD ANTI

## (undated) DEVICE — PLASTICS CHEST BREAST ASU: Brand: MEDLINE INDUSTRIES, INC.

## (undated) DEVICE — BLADE ES ELASTOMERIC COAT INSUL DURABLE BEND UPTO 90DEG

## (undated) DEVICE — BRA COMPR 2XL NYL LYCRA SPANDEX WHT CURAD

## (undated) DEVICE — CLEAN UP KIT: Brand: MEDLINE INDUSTRIES, INC.

## (undated) DEVICE — BLADE,STAINLESS-STEEL,15,STRL,DISPOSABLE: Brand: MEDLINE

## (undated) DEVICE — GAMMEX® NON-LATEX PI UNDERGLOVE SIZE 6.5, STERILE POLYISOPRENE POWDER-FREE SURGICAL GLOVE: Brand: GAMMEX

## (undated) DEVICE — 450 ML BOTTLE OF 0.05% CHLORHEXIDINE GLUCONATE IN 99.95% STERILE WATER FOR IRRIGATION, USP AND APPLICATOR.: Brand: IRRISEPT ANTIMICROBIAL WOUND LAVAGE

## (undated) DEVICE — APPLICATOR MEDICATED 26 CC SOLUTION HI LT ORNG CHLORAPREP